# Patient Record
Sex: MALE | Race: WHITE | NOT HISPANIC OR LATINO | Employment: OTHER | URBAN - NONMETROPOLITAN AREA
[De-identification: names, ages, dates, MRNs, and addresses within clinical notes are randomized per-mention and may not be internally consistent; named-entity substitution may affect disease eponyms.]

---

## 2017-01-25 ENCOUNTER — OFFICE VISIT - GICH (OUTPATIENT)
Dept: FAMILY MEDICINE | Facility: OTHER | Age: 57
End: 2017-01-25

## 2017-01-25 DIAGNOSIS — N39.41 URGE INCONTINENCE: ICD-10-CM

## 2017-01-25 DIAGNOSIS — Z00.00 ENCOUNTER FOR GENERAL ADULT MEDICAL EXAMINATION WITHOUT ABNORMAL FINDINGS: ICD-10-CM

## 2017-01-25 DIAGNOSIS — E78.2 MIXED HYPERLIPIDEMIA: ICD-10-CM

## 2017-01-25 DIAGNOSIS — I10 ESSENTIAL (PRIMARY) HYPERTENSION: ICD-10-CM

## 2017-01-25 DIAGNOSIS — N40.1 ENLARGED PROSTATE WITH LOWER URINARY TRACT SYMPTOMS (LUTS): ICD-10-CM

## 2017-01-25 DIAGNOSIS — L21.9 SEBORRHEIC DERMATITIS: ICD-10-CM

## 2017-02-09 ENCOUNTER — AMBULATORY - GICH (OUTPATIENT)
Dept: LAB | Facility: OTHER | Age: 57
End: 2017-02-09

## 2017-02-09 DIAGNOSIS — Z00.00 ENCOUNTER FOR GENERAL ADULT MEDICAL EXAMINATION WITHOUT ABNORMAL FINDINGS: ICD-10-CM

## 2017-02-09 LAB
ANION GAP - HISTORICAL: 7 (ref 5–18)
BUN SERPL-MCNC: 16 MG/DL (ref 7–25)
BUN/CREAT RATIO - HISTORICAL: 20
CALCIUM SERPL-MCNC: 9.7 MG/DL (ref 8.6–10.3)
CHLORIDE SERPLBLD-SCNC: 107 MMOL/L (ref 98–107)
CHOL/HDL RATIO - HISTORICAL: 4.11
CHOLESTEROL TOTAL: 185 MG/DL
CO2 SERPL-SCNC: 22 MMOL/L (ref 21–31)
CREAT SERPL-MCNC: 0.82 MG/DL (ref 0.7–1.3)
GFR IF NOT AFRICAN AMERICAN - HISTORICAL: >60 ML/MIN/1.73M2
GLUCOSE SERPL-MCNC: 97 MG/DL (ref 70–105)
HDLC SERPL-MCNC: 45 MG/DL (ref 23–92)
LDLC SERPL CALC-MCNC: 109 MG/DL
NON-HDL CHOLESTEROL - HISTORICAL: 140 MG/DL
PATIENT STATUS - HISTORICAL: ABNORMAL
POTASSIUM SERPL-SCNC: 4.4 MMOL/L (ref 3.5–5.1)
PSA TOTAL (DIAGNOSTIC) - HISTORICAL: 0.89 NG/ML
SODIUM SERPL-SCNC: 136 MMOL/L (ref 133–143)
TRIGL SERPL-MCNC: 156 MG/DL

## 2017-11-08 ENCOUNTER — HISTORY (OUTPATIENT)
Dept: FAMILY MEDICINE | Facility: OTHER | Age: 57
End: 2017-11-08

## 2017-11-08 ENCOUNTER — OFFICE VISIT - GICH (OUTPATIENT)
Dept: FAMILY MEDICINE | Facility: OTHER | Age: 57
End: 2017-11-08

## 2017-11-08 DIAGNOSIS — M77.12 LATERAL EPICONDYLITIS OF LEFT ELBOW: ICD-10-CM

## 2017-11-08 DIAGNOSIS — H00.015 HORDEOLUM EXTERNUM OF LEFT LOWER EYELID: ICD-10-CM

## 2017-11-08 DIAGNOSIS — M77.11 LATERAL EPICONDYLITIS OF RIGHT ELBOW: ICD-10-CM

## 2017-12-28 NOTE — PROGRESS NOTES
"Patient Information     Patient Name MRN Sex Watson Jurado [id number] Male 1960      Progress Notes by Kang Flynn MD at 2017  1:30 PM     Author:  Kang Flynn MD Service:  (none) Author Type:  Physician     Filed:  2017 12:31 PM Encounter Date:  2017 Status:  Signed     :  Kang Flynn MD (Physician)            Nursing Notes:   Lashell Alfaro  2017  1:52 PM  Signed  Patient comes in to the clinic today to evaluate a problem with his left lower eyelid and also bilateral elbow pain.    DominicLashell jose  2017  2:16 PM  Signed  [facility] Protocol    A. Pre-procedure verification complete yes  1-relevant information / documentation available, reviewed and properly matched to the patient; 2-consent accurate and complete, 3-equipment and supplies available    B. Site marking complete N/A  Site marked if not in continuous attendance with patient    C. TIME OUT completed yes  Time Out was conducted just prior to starting procedure to verify the eight required elements: 1-patient identity, 2-consent accurate and complete, 3-position, 4-correct side/site marked (if applicable), 5-procedure, 6-relevant images / results properly labeled and displayed (if applicable), 7-antibiotics / irrigation fluids (if applicable), 8-safety precautions.          SUBJECTIVE:  Watson Price is a 56 y.o. Male.  He comes in today for evaluation of 2 issues. First issue is stye at his left lower eyelid. Has been present for the past week.  Vision has been normal.    Patient reports tenderness with palpation over lateral epicondyles. It is worse with lifting objects and pronation/supination and to a lesser degree extension of the wrist.  Has been present for the past couple months. Had similar symptoms over a year ago and had injection of lateral epicondyles with complete relief up until recently    OBJECTIVE:  /78  Pulse 76  Ht 1.778 m (5' 10\")  Wt 100.2 kg (221 lb)  " BMI 31.71 kg/m2  EXAM:  General Appearance: Pleasant, alert, appropriate appearance for age. No acute distress  Eye Exam: Lids: Style noted on lower lid.  Chest/Respiratory Exam: Normal chest wall and respirations. Clear to auscultation.  Cardiovascular Exam: Regular rate and rhythm. S1, S2, no murmur, click, gallop, or rubs.  Musculoskeletal Exam: Tender with palpation of both lateral epicondyles and to a lesser extent the medial epicondyles.    Results for orders placed or performed in visit on 02/09/17      LIPID PANEL      Result  Value Ref Range    CHOLESTEROL,TOTAL 185 <200 mg/dL    TRIGLYCERIDES 156 (H) <150 mg/dL    HDL CHOLESTEROL 45 23 - 92 mg/dL    NON-HDL CHOLESTEROL 140 <145 mg/dl    CHOL/HDL RATIO            4.11 <4.50                    LDL CHOLESTEROL 109 (H) <100 mg/dL    PATIENT STATUS            NOT GIVEN                   PSA, TOTAL      Result  Value Ref Range    PSA TOTAL (DIAGNOSTIC) 0.894 <=3.100 ng/mL   BASIC METABOLIC PANEL      Result  Value Ref Range    SODIUM 136 133 - 143 mmol/L    POTASSIUM 4.4 3.5 - 5.1 mmol/L    CHLORIDE 107 98 - 107 mmol/L    CO2,TOTAL 22 21 - 31 mmol/L    ANION GAP 7 5 - 18                    GLUCOSE 97 70 - 105 mg/dL    CALCIUM 9.7 8.6 - 10.3 mg/dL    BUN 16 7 - 25 mg/dL    CREATININE 0.82 0.70 - 1.30 mg/dL    BUN/CREAT RATIO           20                    GFR if African American >60 >60 ml/min/1.73m2    GFR if not African American >60 >60 ml/min/1.73m2       ASSESSMENT/Plan :      Watson was seen today for eye problem and arm pain/problem.    Diagnoses and all orders for this visit:    Hordeolum externum of left lower eyelid  discussed with patient that I would suggest warm pack compressions and should resolve on its own. He can try some Polytrim although I explained that the studies have not shown it to be particularly helpful over placebo.  -     rx trimethoprim-polymixin b (POLYTRIM) 0.1-1000 %-unit drop ophthalmic solution ([facility]); Place 1 Drop into left  eye every 4 hours for 7 days.    Lateral epicondylitis of left elbow  After chlorhexadine prep sterile procedure was used to inject 10mg of kenalog and 1ml of 1% lidocaine and 1ml of 0.25% marcaine just distal to the left lateral epicondyle at point of maximal tenderness in a fan-like fashion using a 25 gauge needle.  Total volume of 2ml injected with no complication.  Patient had excellent improvement in symptoms following injection.     -     NM DRAIN/INJECT INTERMEDIATE JOINT/BURSA (wrist, elbow) - bilateral joints [63561.0]  -     triamcinolone acetonide 10 mg injection (KENALOG); Inject 1 mL intra-articular one time.    Lateral epicondylitis of right elbow  After chlorhexadine prep sterile procedure was used to inject 10mg of kenalog and 1ml of 1% lidocaine and 1ml of 0.25% marcaine just distal to the right lateral epicondyle at point of maximal tenderness in a fan-like fashion using a 25 gauge needle.  Total volume of 2ml injected with no complication.  Patient had excellent improvement in symptoms following injection.     -     NM DRAIN/INJECT INTERMEDIATE JOINT/BURSA (wrist, elbow) - bilateral joints [41781.0]  -     triamcinolone acetonide 10 mg injection (KENALOG); Inject 1 mL intra-articular one time.      Suggest icing and stretching of both lateral and medial epicondyles.    There are no Patient Instructions on file for this visit.    Kang Flynn MD    This document was created using computer generated templates and voice activated software.

## 2017-12-30 NOTE — NURSING NOTE
Patient Information     Patient Name MRN Sex Watson Jurado [id number] Male 1960      Nursing Note by Lashell Alfaro at 2017  1:30 PM     Author:  Lashell Alfaro Service:  (none) Author Type:  (none)     Filed:  2017  2:16 PM Encounter Date:  2017 Status:  Signed     :  Lashell Alfaro            Universal Protocol    A. Pre-procedure verification complete yes  1-relevant information / documentation available, reviewed and properly matched to the patient; 2-consent accurate and complete, 3-equipment and supplies available    B. Site marking complete N/A  Site marked if not in continuous attendance with patient    C. TIME OUT completed yes  Time Out was conducted just prior to starting procedure to verify the eight required elements: 1-patient identity, 2-consent accurate and complete, 3-position, 4-correct side/site marked (if applicable), 5-procedure, 6-relevant images / results properly labeled and displayed (if applicable), 7-antibiotics / irrigation fluids (if applicable), 8-safety precautions.

## 2017-12-30 NOTE — NURSING NOTE
Patient Information     Patient Name MRN Sex Watson Jurado [id number] Male 1960      Nursing Note by Lashell Alfaro at 2017  1:30 PM     Author:  Lashell Alfaro Service:  (none) Author Type:  (none)     Filed:  2017  1:52 PM Encounter Date:  2017 Status:  Signed     :  Lashell Alfaro            Patient comes in to the clinic today to evaluate a problem with his left lower eyelid and also bilateral elbow pain.

## 2018-01-03 NOTE — PROGRESS NOTES
Patient Information     Patient Name MRN Sex Watson Jurado [id number] Male 1960      Progress Notes by Kang Flynn MD at 2017  2:30 PM     Author:  Kang Flynn MD Service:  (none) Author Type:  Physician     Filed:  2017 10:45 AM Encounter Date:  2017 Status:  Signed     :  Kang Flynn MD (Physician)            SUBJECTIVE:    Watson Price is a 56 y.o. male who presents for physical and management of chronic diseases    HPI: Patient reports that he feels well. He continues to take metoprolol, simvastatin and Flomax. He is also taking aspirin 81 mg per day. He denies any chest pain, shortness of breath, indigestion with exertion or any other anginal equivalents. He does have a small lump on his back that he thinks maybe has gotten bigger. It is nonpainful and does not bother him.    He reports the Flomax is worse excellent at improving flow. He does not have any problems with dribbling but does report some urinary urge not quite to the level of incontinence. Often he left a run to the bathroom oral have to urinate outside if possible.    PROBLEM LIST:  Patient Active Problem List     Diagnosis  Code     NODULAR PROSTATE WITH URINARY OBSTRUCTION N40.3, N13.8     Migraine, unspecified, without mention of intractable migraine without mention of status migrainosus G43.909     Hypertension I10     Right rotator cuff tear M75.101     S/P arthroscopic right shoulder surgery Z98.890     PAST MEDICAL HISTORY:  Past Medical History      Diagnosis   Date     DIVERTICULOSIS, MILD  1/3/2011      inactive icd-9 diagnosis auto replaced with icd-10, display name retained//mporz      Hypertension       Impingement syndrome of right shoulder  2015     Migraine       Nodular prostate with urinary obstruction       Right rotator cuff tear  2015     Right rotator cuff tendonitis  2015     S/P arthroscopic right shoulder surgery  2016     SURGICAL  HISTORY:  Past Surgical History       Procedure   Laterality Date     Fracture treatment   2009     Left,  Mandibular fracture with subsequent ORIF        Colonoscopy screening   01/03/2011     F/U 2021       S/p right arthroscopic shoulder surgery  Right 1/6/2016       SOCIAL HISTORY:  Social History     Social History        Marital status:       Spouse name: N/A     Number of children:  N/A     Years of education:  N/A     Occupational History      Not on file.     Social History Main Topics          Smoking status:   Current Every Day Smoker      Packs/day:  5.00      Types:  Cigars      Smokeless tobacco:   Never Used      Alcohol use   Yes      Comment: some - 8 per week       Drug use:   No      Sexual activity:   Not on file      Other Topics  Concern     Not on file      Social History Narrative     .  Runs a [employer] [place].      Works as a [employer] with [employer]    Smokes cigars.  Positive alcohol.             FAMILY HISTORY:  Family History       Problem   Relation Age of Onset     Stroke  Mother 76     Other  Father      Macular degeneration.       Heart Disease  Father      Possible CAD        CURRENT MEDICATIONS:   Current Outpatient Prescriptions       Medication  Sig Dispense Refill     ketoconazole 2% topical (NIZORAL) cream AAA Bid 60 g 0     metoprolol succinate (TOPROL XL) 100 mg Sustained-Release tablet Take 1 tablet by mouth once daily. 90 tablet 3     oxybutynin XL (DITROPAN XL) 5 mg CR tablet Take 1 tablet by mouth once daily. 30 tablet 11     simvastatin (ZOCOR) 20 mg tablet Take 1 tablet by mouth at bedtime. 90 tablet 3     tamsulosin (FLOMAX) 0.4 mg capsule Take 1 capsule by mouth once daily after a meal. 90 capsule 3     No current facility-administered medications for this visit.      Medications have been reviewed by me and are current to the best of my knowledge and ability.    ALLERGIES:  Review of patient's allergies indicates no known  "allergies.    REVIEW OF SYSTEMS:  General: denies any general problems.  Eyes: denies problems  Ears/Nose/Throat: denies problems  Cardiovascular: denies problems  Respiratory: denies problems  Gastrointestinal: denies problems  Genitourinary: denies problems  Musculoskeletal: denies problems  Skin: See history of present illness  Neurologic: denies problems  Psychiatric: denies problems  Endocrine: denies problems  Heme/Lymphatic: denies problems  Allergic/Immunologic: denies problems  PHQ Depression Screening 1/25/2017   Date of PHQ exam (doc flow) 1/25/2017   1. Lack of interest/pleasure 0 - Not at all   2. Feeling down/depressed 0 - Not at all   PHQ-2 TOTAL SCORE 0   3. Trouble sleeping -   4. Decreased energy -   5. Appetite change -   6. Feelings of failure -   7. Trouble concentrating -   8. Activity level -   9. Hurting yourself -   PHQ-9 TOTAL SCORE -   PHQ-9 Severity Level -   Functional Impairment -      OBJECTIVE:  /84  Pulse 80  Ht 1.778 m (5' 10\")  Wt 96.6 kg (213 lb)  BMI 30.56 kg/m2  EXAM:   General Appearance: Pleasant, alert, appropriate appearance for age. No acute distress  Head Exam: Normal. Normocephalic, atraumatic.  Eye Exam:  Normal external eye, conjunctiva, lids, cornea. JEFF.  Fundoscopic Exam: Normal red reflex and fundoscopic exam.  Ear Exam: Normal TM's bilaterally. Normal auditory canals and external ears. Non-tender.  Nose Exam: Normal external nose, mucus membranes, and septum.  OroPharynx Exam:  Dental hygiene adequate. Normal buccal mucosa. Normal pharynx.  Neck Exam:  Supple, no masses or nodes.  Thyroid Exam: No nodules or enlargement.  Chest/Respiratory Exam: Normal chest wall and respirations. Clear to auscultation.  Cardiovascular Exam: Regular rate and rhythm. S1, S2, no murmur, click, gallop, or rubs.  Gastrointestinal Exam: Soft, non-tender, no masses or organomegaly.  Rectal Exam: Normal sphincter tone. No masses noted.  No nodules noted today.  Symmetric but " diffusely enlarged prostate, unchanged from previous.  Genitourinary Exam Male: Normal male genitalia. No discharge or penile ulcerations. No testicular masses or swelling.  Lymphatic Exam: Non-palpable nodes in neck, clavicular, axillary, or inguinal regions.  Musculoskeletal Exam: Back is straight and non-tender, full ROM of upper and lower extremities.  Foot Exam: Left and right foot: good pedal pulses, no lesions, nail hygiene good.  Skin: Seborrhea noted near her eyebrows and mustache.  No lesions concerning for skin cancer. He does have a 1 cm sebaceous cyst near midline of the upper thoracic spine. It is nontender and noninflamed.  Neurologic Exam: Nonfocal, symmetric DTRs, normal gross motor, tone coordination and no tremor.  Psychiatric Exam: Alert and oriented - appropriate affect.          ASSESSMENT/PLAN:    ICD-10-CM    1. Physical exam Z00.00 LIPID PANEL      PSA TOTAL (DIAGNOSTIC)      BASIC METABOLIC PANEL - patient will come in when fasting for annual labs.    2. Hypertension I10 metoprolol succinate (TOPROL XL) 100 mg Sustained-Release tablet - blood pressure at goal.    3. Mixed hyperlipidemia E78.2 simvastatin (ZOCOR) 20 mg tablet   4. Benign non-nodular prostatic hyperplasia with lower urinary tract symptoms N40.1 tamsulosin (FLOMAX) 0.4 mg capsule - continue Flomax. We will recheck PSA.    5. Seborrhea L21.9 ketoconazole 2% topical (NIZORAL) cream   6. Urge incontinence N39.41 oxybutynin XL (DITROPAN XL) 5 mg CR tablet - trial Ditropan. Refer to urology if ineffective or if PSA comes back elevated      Mr. Price's Body mass index is 30.56 kg/(m^2). This is out of the normal range for a 56 y.o. Normal range for ages 18-64 is between 18.5 and 24.9; normal range for ages 65+ is 23-30. To lose weight we reviewed risks and benefits of appropriate options such as diet, exercise, and medications. Patient's strategy will be  self-directed nutrition plan and self-directed exercise program  BP Readings  from Last 1 Encounters:01/25/17 : 118/84  Mr. Price's blood pressure is out of the normal range for adults. Per JNC-8 guidelines normal adult blood pressure is < 120/80, pre-hypertensive is between 120/80 and 139/89, and hypertension is 140/90 or greater. Risks of hypertension were discussed. Patient's strategy will be reduced salt intake    Kang Flynn MD

## 2018-01-16 ENCOUNTER — COMMUNICATION - GICH (OUTPATIENT)
Dept: FAMILY MEDICINE | Facility: OTHER | Age: 58
End: 2018-01-16

## 2018-01-16 DIAGNOSIS — H00.015 HORDEOLUM EXTERNUM OF LEFT LOWER EYELID: ICD-10-CM

## 2018-01-23 ENCOUNTER — COMMUNICATION - GICH (OUTPATIENT)
Dept: FAMILY MEDICINE | Facility: OTHER | Age: 58
End: 2018-01-23

## 2018-01-23 DIAGNOSIS — I10 ESSENTIAL (PRIMARY) HYPERTENSION: ICD-10-CM

## 2018-01-23 DIAGNOSIS — N39.41 URGE INCONTINENCE: ICD-10-CM

## 2018-01-27 VITALS
WEIGHT: 221 LBS | SYSTOLIC BLOOD PRESSURE: 124 MMHG | HEART RATE: 76 BPM | HEIGHT: 70 IN | DIASTOLIC BLOOD PRESSURE: 78 MMHG | BODY MASS INDEX: 31.64 KG/M2

## 2018-01-27 VITALS
DIASTOLIC BLOOD PRESSURE: 84 MMHG | HEART RATE: 80 BPM | SYSTOLIC BLOOD PRESSURE: 118 MMHG | WEIGHT: 213 LBS | BODY MASS INDEX: 30.49 KG/M2 | HEIGHT: 70 IN

## 2018-02-05 ENCOUNTER — DOCUMENTATION ONLY (OUTPATIENT)
Dept: FAMILY MEDICINE | Facility: OTHER | Age: 58
End: 2018-02-05

## 2018-02-05 PROBLEM — N40.1 BENIGN NON-NODULAR PROSTATIC HYPERPLASIA WITH LOWER URINARY TRACT SYMPTOMS: Status: ACTIVE | Noted: 2017-01-30

## 2018-02-05 RX ORDER — SIMVASTATIN 20 MG
20 TABLET ORAL AT BEDTIME
COMMUNITY
Start: 2017-01-25 | End: 2018-02-23

## 2018-02-05 RX ORDER — TAMSULOSIN HYDROCHLORIDE 0.4 MG/1
0.4 CAPSULE ORAL DAILY
COMMUNITY
Start: 2017-01-25 | End: 2018-02-23

## 2018-02-05 RX ORDER — KETOCONAZOLE 20 MG/G
CREAM TOPICAL
COMMUNITY
Start: 2017-01-25 | End: 2018-02-28

## 2018-02-05 RX ORDER — OXYBUTYNIN CHLORIDE 5 MG/1
5 TABLET, EXTENDED RELEASE ORAL DAILY
COMMUNITY
Start: 2017-01-25 | End: 2018-02-28

## 2018-02-05 RX ORDER — METOPROLOL SUCCINATE 100 MG/1
100 TABLET, EXTENDED RELEASE ORAL DAILY
COMMUNITY
Start: 2017-01-25 | End: 2018-02-28

## 2018-02-13 NOTE — TELEPHONE ENCOUNTER
Patient Information     Patient Name MRN Watson Mendoza [id number] Male 1960      Telephone Encounter by Cathy Pinto RN at 2018 11:19 AM     Author:  Cathy Pinto RN Service:  (none) Author Type:  NURS- Registered Nurse     Filed:  2018 11:23 AM Encounter Date:  2018 Status:  Signed     :  Cathy Pinto RN (NURS- Registered Nurse)            Patient is due for annual physical today, 2018.    Beta Blockers     Office visit in the past 12 months or per provider note.    Last visit with EVY MIKE was on: 2017 in [facility]  Next visit with EVY MIKE is on: No future appointment listed with this provider  Next visit with Family Practice is on: No future appointment listed in this department    Max refill for 12 months from last office visit or per provider note.    Office visit in the past 12 months or per provider note.    Last visit with EVY MIKE was on: 2017 in [facility]  Next visit with EVY MIKE is on: No future appointment listed with this provider  Next visit with Family Practice is on: No future appointment listed in this department    Max refill for 12 months from last office visit or per provider note.    Patient is due for medication management appointment. Limited refill provided at this time. [employer] message and/or letter sent for reminder to patient. Prescription refilled per RN Medication Refill Policy.................... Cathy Pinto RN ....................  2018   11:22 AM

## 2018-02-13 NOTE — TELEPHONE ENCOUNTER
Patient Information     Patient Name MRN Watson Mendoza [id number] Male 1960      Telephone Encounter by Sofía Prescott at 2018  1:35 PM     Author:  Sofía Prescott Service:  (none) Author Type:  (none)     Filed:  2018  1:37 PM Encounter Date:  2018 Status:  Signed     :  Sofía Prescott            Patient was seen by Dr. Flynn for a stye.  The original prescription was sent to [facility], but the prescription needs to be sent to [facility].  Can prescription be sent to [facility] please?  Patient is still having difficulty with the stye.  Thanks!  Sofía Prescott ....................  2018   1:37 PM

## 2018-02-23 DIAGNOSIS — N40.1 BENIGN PROSTATIC HYPERPLASIA WITH LOWER URINARY TRACT SYMPTOMS, SYMPTOM DETAILS UNSPECIFIED: ICD-10-CM

## 2018-02-23 DIAGNOSIS — E78.00 HIGH BLOOD CHOLESTEROL: Primary | ICD-10-CM

## 2018-02-28 ENCOUNTER — OFFICE VISIT (OUTPATIENT)
Dept: FAMILY MEDICINE | Facility: OTHER | Age: 58
End: 2018-02-28
Attending: FAMILY MEDICINE
Payer: COMMERCIAL

## 2018-02-28 VITALS
DIASTOLIC BLOOD PRESSURE: 74 MMHG | SYSTOLIC BLOOD PRESSURE: 118 MMHG | HEART RATE: 92 BPM | WEIGHT: 217 LBS | BODY MASS INDEX: 31.14 KG/M2

## 2018-02-28 DIAGNOSIS — E78.00 HIGH BLOOD CHOLESTEROL: ICD-10-CM

## 2018-02-28 DIAGNOSIS — R39.15 URINARY URGENCY: ICD-10-CM

## 2018-02-28 DIAGNOSIS — G62.9 PERIPHERAL POLYNEUROPATHY: ICD-10-CM

## 2018-02-28 DIAGNOSIS — I10 ESSENTIAL HYPERTENSION: ICD-10-CM

## 2018-02-28 DIAGNOSIS — M19.042 PRIMARY OSTEOARTHRITIS OF BOTH HANDS: ICD-10-CM

## 2018-02-28 DIAGNOSIS — Z00.00 ROUTINE HISTORY AND PHYSICAL EXAMINATION OF ADULT: Primary | ICD-10-CM

## 2018-02-28 DIAGNOSIS — M19.041 PRIMARY OSTEOARTHRITIS OF BOTH HANDS: ICD-10-CM

## 2018-02-28 DIAGNOSIS — N40.1 BENIGN PROSTATIC HYPERPLASIA WITH LOWER URINARY TRACT SYMPTOMS, SYMPTOM DETAILS UNSPECIFIED: ICD-10-CM

## 2018-02-28 DIAGNOSIS — B35.1 ONYCHOMYCOSIS DUE TO DERMATOPHYTE: ICD-10-CM

## 2018-02-28 LAB
ANION GAP SERPL CALCULATED.3IONS-SCNC: 7 MMOL/L (ref 3–14)
BUN SERPL-MCNC: 19 MG/DL (ref 7–25)
CALCIUM SERPL-MCNC: 9.8 MG/DL (ref 8.6–10.3)
CHLORIDE SERPL-SCNC: 105 MMOL/L (ref 98–107)
CHOLEST SERPL-MCNC: 197 MG/DL
CO2 SERPL-SCNC: 27 MMOL/L (ref 21–31)
CREAT SERPL-MCNC: 0.9 MG/DL (ref 0.7–1.3)
ERYTHROCYTE [DISTWIDTH] IN BLOOD BY AUTOMATED COUNT: 12.8 % (ref 10–15)
FOLATE SERPL-MCNC: 12.1 NG/ML
GFR SERPL CREATININE-BSD FRML MDRD: 87 ML/MIN/1.7M2
GLUCOSE SERPL-MCNC: 129 MG/DL (ref 70–105)
HCT VFR BLD AUTO: 44.5 % (ref 40–53)
HDLC SERPL-MCNC: 42 MG/DL (ref 23–92)
HGB BLD-MCNC: 14.9 G/DL (ref 13.3–17.7)
LDLC SERPL CALC-MCNC: 99 MG/DL
MCH RBC QN AUTO: 31.1 PG (ref 26.5–33)
MCHC RBC AUTO-ENTMCNC: 33.5 G/DL (ref 31.5–36.5)
MCV RBC AUTO: 93 FL (ref 78–100)
NONHDLC SERPL-MCNC: 155 MG/DL
PLATELET # BLD AUTO: 242 10E9/L (ref 150–450)
POTASSIUM SERPL-SCNC: 3.9 MMOL/L (ref 3.5–5.1)
PSA SERPL-MCNC: 0.77 NG/ML
RBC # BLD AUTO: 4.79 10E12/L (ref 4.4–5.9)
SODIUM SERPL-SCNC: 139 MMOL/L (ref 134–144)
TRIGL SERPL-MCNC: 280 MG/DL
VIT B12 SERPL-MCNC: 400 PG/ML (ref 180–914)
WBC # BLD AUTO: 6.9 10E9/L (ref 4–11)

## 2018-02-28 PROCEDURE — 80048 BASIC METABOLIC PNL TOTAL CA: CPT | Performed by: FAMILY MEDICINE

## 2018-02-28 PROCEDURE — 82607 VITAMIN B-12: CPT | Performed by: FAMILY MEDICINE

## 2018-02-28 PROCEDURE — 99396 PREV VISIT EST AGE 40-64: CPT | Performed by: FAMILY MEDICINE

## 2018-02-28 PROCEDURE — 36415 COLL VENOUS BLD VENIPUNCTURE: CPT | Performed by: FAMILY MEDICINE

## 2018-02-28 PROCEDURE — 85027 COMPLETE CBC AUTOMATED: CPT | Performed by: FAMILY MEDICINE

## 2018-02-28 PROCEDURE — 84153 ASSAY OF PSA TOTAL: CPT | Performed by: FAMILY MEDICINE

## 2018-02-28 PROCEDURE — G0463 HOSPITAL OUTPT CLINIC VISIT: HCPCS

## 2018-02-28 PROCEDURE — 80061 LIPID PANEL: CPT | Performed by: FAMILY MEDICINE

## 2018-02-28 PROCEDURE — 82746 ASSAY OF FOLIC ACID SERUM: CPT | Performed by: FAMILY MEDICINE

## 2018-02-28 RX ORDER — SIMVASTATIN 20 MG
TABLET ORAL
Qty: 30 TABLET | Refills: 11 | Status: SHIPPED | OUTPATIENT
Start: 2018-02-28 | End: 2018-02-28

## 2018-02-28 RX ORDER — TAMSULOSIN HYDROCHLORIDE 0.4 MG/1
0.4 CAPSULE ORAL DAILY
Qty: 90 CAPSULE | Refills: 3 | Status: SHIPPED | OUTPATIENT
Start: 2018-02-28 | End: 2019-02-12

## 2018-02-28 RX ORDER — TAMSULOSIN HYDROCHLORIDE 0.4 MG/1
CAPSULE ORAL
Qty: 30 CAPSULE | Refills: 11 | Status: SHIPPED | OUTPATIENT
Start: 2018-02-28 | End: 2018-02-28

## 2018-02-28 RX ORDER — METOPROLOL SUCCINATE 100 MG/1
100 TABLET, EXTENDED RELEASE ORAL DAILY
Qty: 90 TABLET | Refills: 3 | Status: SHIPPED | OUTPATIENT
Start: 2018-02-28 | End: 2019-02-12

## 2018-02-28 RX ORDER — OXYBUTYNIN CHLORIDE 5 MG/1
5 TABLET, EXTENDED RELEASE ORAL DAILY
Qty: 90 TABLET | Refills: 3 | Status: SHIPPED | OUTPATIENT
Start: 2018-02-28 | End: 2019-02-12

## 2018-02-28 RX ORDER — SIMVASTATIN 20 MG
20 TABLET ORAL AT BEDTIME
Qty: 90 TABLET | Refills: 3 | Status: SHIPPED | OUTPATIENT
Start: 2018-02-28 | End: 2019-02-12

## 2018-02-28 NOTE — TELEPHONE ENCOUNTER
Refill request for Zocor and Flomax sent from [facility].    Evan Marinelli LPN 02/28/18 9:00 AM

## 2018-02-28 NOTE — MR AVS SNAPSHOT
After Visit Summary   2/28/2018    Watson Price    MRN: [id number]           Patient Information     Date Of Birth          1960        Visit Information        Provider Department      2/28/2018 1:30 PM Kang Flynn MD [facility]        Today's Diagnoses     Routine history and physical examination of adult    -  1    High blood cholesterol        Benign prostatic hyperplasia with lower urinary tract symptoms, symptom details unspecified        Urinary urgency        Essential hypertension        Onychomycosis due to dermatophyte        Primary osteoarthritis of both hands        Peripheral polyneuropathy          Care Instructions      Preventive Health Recommendations  Male Ages 50 - 64    Yearly exam:             See your health care provider every year in order to  o   Review health changes.   o   Discuss preventive care.    o   Review your medicines if your doctor has prescribed any.     Have a cholesterol test every 5 years, or more frequently if you are at risk for high cholesterol/heart disease.     Have a diabetes test (fasting glucose) every three years. If you are at risk for diabetes, you should have this test more often.     Have a colonoscopy at age 50, or have a yearly FIT test (stool test). These exams will check for colon cancer.      Talk with your health care provider about whether or not a prostate cancer screening test (PSA) is right for you.    You should be tested each year for STDs (sexually transmitted diseases), if you re at risk.     Shots: Get a flu shot each year. Get a tetanus shot every 10 years.     Nutrition:    Eat at least 5 servings of fruits and vegetables daily.     Eat whole-grain bread, whole-wheat pasta and brown rice instead of white grains and rice.     Talk to your provider about Calcium and Vitamin D.     Lifestyle    Exercise for at least 150 minutes a week (30 minutes a day, 5 days a week). This will help you control  your weight and prevent disease.     Limit alcohol to one drink per day.     No smoking.     Wear sunscreen to prevent skin cancer.     See your dentist every six months for an exam and cleaning.     See your eye doctor every 1 to 2 years.            Follow-ups after your visit        Your next 10 appointments already scheduled     Mar 08, 2018  2:30 PM CST   (Arrive by 2:15 PM)   XR HAND LEFT 2 VIEWS with [id number]   [facility]    [address]  [place] [address]   [phone]           Please bring a list of your current medicines to your exam. (Include vitamins, minerals and over-thecounter medicines.) Leave your valuables at home.  Tell your doctor if there is a chance you may be pregnant.  You do not need to do anything special for this exam.            Mar 08, 2018  2:45 PM CST   XR HAND RIGHT 2 VIEWS with [id number]   [facility] ([facility])    [address]  [place] [address]   [phone]           Please bring a list of your current medicines to your exam. (Include vitamins, minerals and over-thecounter medicines.) Leave your valuables at home.  Tell your doctor if there is a chance you may be pregnant.  You do not need to do anything special for this exam.              Who to contact     If you have questions or need follow up information about today's clinic visit or your schedule please contact [facility] directly at [phone].  Normal or non-critical lab and imaging results will be communicated to you by People Patternhart, letter or phone within 4 business days after the clinic has received the results. If you do not hear from us within 7 days, please contact the clinic through Grow Mobilet or phone. If you have a critical or abnormal lab result, we will notify you by phone as soon as possible.  Submit refill requests through Floor64 or call your pharmacy and they  will forward the refill request to us. Please allow 3 business days for your refill to be completed.          Additional Information About Your Visit        Care EveryWhere ID     This is your Care EveryWhere ID. This could be used by other organizations to access your [place] medical records  [id number]        Your Vitals Were     Pulse BMI (Body Mass Index)                92 31.14 kg/m2           Blood Pressure from Last 3 Encounters:   02/28/18 118/74   11/08/17 124/78   01/25/17 118/84    Weight from Last 3 Encounters:   02/28/18 217 lb (98.4 kg)   11/08/17 221 lb (100.2 kg)   01/25/17 213 lb (96.6 kg)              We Performed the Following     Basic metabolic panel     CBC with platelets     Folate     Lipid Profile (Chol, Trig, HDL, LDL calc) - FASTING     PSA GH     Vitamin B12          Today's Medication Changes          These changes are accurate as of 2/28/18 11:59 PM.  If you have any questions, ask your nurse or doctor.               Start taking these medicines.        Dose/Directions    terbinafine 1 % cream   Commonly known as:  lamISIL   Used for:  Onychomycosis due to dermatophyte   Started by:  Kang Flynn MD        Apply topically 2 times daily   Quantity:  30 g   Refills:  1         These medicines have changed or have updated prescriptions.        Dose/Directions    simvastatin 20 MG tablet   Commonly known as:  ZOCOR   This may have changed:  See the new instructions.   Used for:  High blood cholesterol   Changed by:  Kang Flynn MD        Dose:  20 mg   Take 1 tablet (20 mg) by mouth At Bedtime   Quantity:  90 tablet   Refills:  3       tamsulosin 0.4 MG capsule   Commonly known as:  FLOMAX   This may have changed:  See the new instructions.   Used for:  Benign prostatic hyperplasia with lower urinary tract symptoms, symptom details unspecified   Changed by:  Kang Flynn MD        Dose:  0.4 mg   Take 1 capsule (0.4 mg) by mouth daily   Quantity:  90 capsule   Refills:   3         Stop taking these medicines if you haven't already. Please contact your care team if you have questions.     ketoconazole 2 % cream   Commonly known as:  NIZORAL   Stopped by:  Kang Flynn MD                Where to get your medicines      These medications were sent to [id number] - [place], [place] - [address]  [address], [place] [address]     Phone:  [phone]     metoprolol succinate 100 MG 24 hr tablet    oxybutynin 5 MG 24 hr tablet    simvastatin 20 MG tablet    tamsulosin 0.4 MG capsule    terbinafine 1 % cream                Primary Care Provider Office Phone # Fax #    Kang Flynn [phone]       [address]  [facility] [address]        Equal Access to Services     [facility]: Hadii aad ku hadasho Soomaali, waaxda luqadaha, qaybta kaalmada adeegyada, diya marcum hayheather ashford . So [facility] [phone].    ATENCIÓN: Si habla español, tiene a rivera disposición servicios gratuitos de asistencia lingüística. [facility] [phone].    We comply with applicable federal civil rights laws and [place] laws. We do not discriminate on the basis of race, color, national origin, age, disability, sex, sexual orientation, or gender identity.            Thank you!     Thank you for choosing [facility]  for your care. Our goal is always to provide you with excellent care. Hearing back from our patients is one way we can continue to improve our services. Please take a few minutes to complete the written survey that you may receive in the mail after your visit with us. Thank you!             Your Updated Medication List - Protect others around you: Learn how to safely use, store and throw away your medicines at www.disposemymeds.org.          This list is accurate as of 2/28/18 11:59 PM.  Always use your most recent med list.                   Brand Name Dispense Instructions for use  Diagnosis    metoprolol succinate 100 MG 24 hr tablet    TOPROL-XL    90 tablet    Take 1 tablet (100 mg) by mouth daily    Essential hypertension       oxybutynin 5 MG 24 hr tablet    DITROPAN-XL    90 tablet    Take 1 tablet (5 mg) by mouth daily    Urinary urgency       simvastatin 20 MG tablet    ZOCOR    90 tablet    Take 1 tablet (20 mg) by mouth At Bedtime    High blood cholesterol       tamsulosin 0.4 MG capsule    FLOMAX    90 capsule    Take 1 capsule (0.4 mg) by mouth daily    Benign prostatic hyperplasia with lower urinary tract symptoms, symptom details unspecified       terbinafine 1 % cream    lamISIL    30 g    Apply topically 2 times daily    Onychomycosis due to dermatophyte

## 2018-02-28 NOTE — LETTER
March 5, 2018      Watson Price  [address] [address]        Dear ,    We are writing to inform you of your test results.    Overall your results were ok, but your blood sugar was elevated.  This concerns me that you may be developing diabetes which could be causing your abnormal sensation in your feet and can lead to other more serious complications if not treated.  I would like you to come back in to see me in a couple weeks so we can do some additional testing.  Your cholesterol is also a little high.  Weight loss of 10lbs could significantly improve both your blood sugars and your cholesterol.  Please try to reduce any simple carbohydrates or fried/fatty foods and shoot for goal weight loss of 2lbs per month for the next 6 mos.    Resulted Orders   PSA GH   Result Value Ref Range    Prostate Specific Antigen 0.767 <3.100 ng/mL   CBC with platelets   Result Value Ref Range    WBC 6.9 4.0 - 11.0 10e9/L    RBC Count 4.79 4.4 - 5.9 10e12/L    Hemoglobin 14.9 13.3 - 17.7 g/dL    Hematocrit 44.5 40.0 - 53.0 %    MCV 93 78 - 100 fl    MCH 31.1 26.5 - 33.0 pg    MCHC 33.5 31.5 - 36.5 g/dL    RDW 12.8 10.0 - 15.0 %    Platelet Count 242 150 - 450 10e9/L   Vitamin B12   Result Value Ref Range    Vitamin B12 400 180 - 914 pg/mL   Folate   Result Value Ref Range    Folate 12.1 >5.21 ng/mL   Basic metabolic panel   Result Value Ref Range    Sodium 139 134 - 144 mmol/L    Potassium 3.9 3.5 - 5.1 mmol/L    Chloride 105 98 - 107 mmol/L    Carbon Dioxide 27 21 - 31 mmol/L    Anion Gap 7 3 - 14 mmol/L    Glucose 129 (H) 70 - 105 mg/dL    Urea Nitrogen 19 7 - 25 mg/dL    Creatinine 0.90 0.70 - 1.30 mg/dL    GFR Estimate 87 >60 mL/min/1.7m2    GFR Estimate If Black >90 >60 mL/min/1.7m2    Calcium 9.8 8.6 - 10.3 mg/dL   Lipid Profile (Chol, Trig, HDL, LDL calc) - FASTING   Result Value Ref Range    Cholesterol 197 <200 mg/dL    Triglycerides 280 (H) <150 mg/dL      Comment:      Borderline high:  150-199  mg/dl  High:             200-499 mg/dl  Very high:       >499 mg/dl      HDL Cholesterol 42 23 - 92 mg/dL    LDL Cholesterol Calculated 99 <100 mg/dL      Comment:      Desirable:       <100 mg/dl    Non HDL Cholesterol 155 (H) <130 mg/dL      Comment:      Above Desirable:  130-159 mg/dl  Borderline high:  160-189 mg/dl  High:             190-219 mg/dl  Very high:       >219 mg/dl         If you have any questions or concerns, please call the clinic at the number listed above.       Sincerely,        Kang Flynn MD

## 2018-03-05 NOTE — PROGRESS NOTES
"  SUBJECTIVE:   CC: Watson Price is an 57 year old male who presents for preventative health visit.     Healthy Habits:    Do you get at least three servings of calcium containing foods daily (dairy, green leafy vegetables, etc.)? yes    Problems taking medications regularly No    Medication side effects: No    He comes in today with his wife for annual physical.  He has a few concerns.  First concern is pain that he has noticed that his first MCP.  At times feels like it is locking.  He does not recall any specific trauma to the area but tells me that he is often wrenching on his vehicles and [place].  Reports he \"busted his knuckles\" lots of times.  Symptoms are bilateral but worse on the right.    Next issue is pins and needle sensation on the balls of his feet.  Feels as if it is asleep.  Symptoms used to be intermittent but now are fairly consistent.  No polydipsia.  Does report some chronic urinary urgency, better with medication.  At times notices that his feet can become quite cold and pale.  Not really affected as much in his hands.    He is on oxybutynin and reports significant improvement in the urinary urgency when he takes the medication.  Also on Flomax which has helped with flow.  No recent changes in symptoms.    Continues on simvastatin without side effect.  Also on metoprolol.      Today's PHQ-2 Score:   PHQ-2 ( 1999 Pfizer) 2/28/2018   Q1: Little interest or pleasure in doing things 0   Q2: Feeling down, depressed or hopeless 0   PHQ-2 Score 0       Abuse: Current or Past(Physical, Sexual or Emotional)- No  Do you feel safe in your environment - Yes    Social History   Substance Use Topics     Smoking status: Current Every Day Smoker     Packs/day: 5.00     Types: Cigars     Smokeless tobacco: Never Used     Alcohol use 14.4 oz/week      Comment: Alcoholic Drinks/day: case of beer per week                          Last PSA: No results found for: PSA      Reviewed and updated " as needed this visit by clinical staff  Tobacco  Allergies  Meds         Reviewed and updated as needed this visit by Provider        Past Medical History:   Diagnosis Date     Diverticulosis of large intestine without perforation or abscess without bleeding     1/3/2011, inactive icd-9 diagnosis auto replaced with icd-10, display name retained//mporz     Essential (primary) hypertension     No Comments Provided     Impingement syndrome of right shoulder     5/21/2015     Migraine without status migrainosus, not intractable     No Comments Provided     Nodular prostate with lower urinary tract symptoms     No Comments Provided     Other shoulder lesions, right shoulder     5/21/2015     Other specified postprocedural states     1/6/2016     Right rotator cuff tear     6/12/2015      Past Surgical History:   Procedure Laterality Date     COLONOSCOPY      01/03/2011,F/U 2021     FRACTURE SURGERY      2009,Left,  Mandibular fracture with subsequent ORIF     OTHER SURGICAL HISTORY      1/6/2016,029521,OTHER,Right       ROS:  C: NEGATIVE for fever, chills, change in weight  I: NEGATIVE for worrisome rashes, moles or lesions  E: NEGATIVE for vision changes or irritation  ENT: NEGATIVE for ear, mouth and throat problems  R: NEGATIVE for significant cough or SOB  CV: NEGATIVE for chest pain, palpitations or peripheral edema  GI: NEGATIVE for nausea, abdominal pain, heartburn, or change in bowel habits   male: negative for dysuria, hematuria, erectile dysfunction, urethral discharge.  Does have urinary urgency and chronic decreased stream with no acute change.  M: See HPI  N: NEGATIVE for weakness, dizziness or paresthesias  P: NEGATIVE for changes in mood or affect    OBJECTIVE:   /74  Pulse 92  Wt 217 lb (98.4 kg)  BMI 31.14 kg/m2  EXAM:  GENERAL: healthy, alert and no distress  EYES: Eyes grossly normal to inspection, PERRL and conjunctivae and sclerae normal  HENT: ear canals and TM's normal, nose and mouth  without ulcers or lesions  NECK: no adenopathy, no asymmetry, masses, or scars and thyroid normal to palpation  RESP: lungs clear to auscultation - no rales, rhonchi or wheezes  CV: Regularrate and rhythm.  No murmurs rubs or gallops heard.   ABDOMEN: soft, nontender, no hepatosplenomegaly, no masses and bowel sounds normal  Genitourinary: Patient declined BYRON  MS: no gross musculoskeletal defects noted, no edema.  Tender at 1st MCP bilaterally R>L with no obvious synovitis  SKIN: no suspicious lesions or rashes.  He does have thickened nails.  NEURO: Normal strength and tone, mentation intact and speech normal.  Monofilament sensation in tact but sensation feels abnormal to him.  Joint position sense in tact.  Reflexes are all normal.  PSYCH: mentation appears normal, affect normal/bright    Results for orders placed or performed in visit on 02/28/18   PSA GH   Result Value Ref Range    Prostate Specific Antigen 0.767 <3.100 ng/mL   CBC with platelets   Result Value Ref Range    WBC 6.9 4.0 - 11.0 10e9/L    RBC Count 4.79 4.4 - 5.9 10e12/L    Hemoglobin 14.9 13.3 - 17.7 g/dL    Hematocrit 44.5 40.0 - 53.0 %    MCV 93 78 - 100 fl    MCH 31.1 26.5 - 33.0 pg    MCHC 33.5 31.5 - 36.5 g/dL    RDW 12.8 10.0 - 15.0 %    Platelet Count 242 150 - 450 10e9/L   Vitamin B12   Result Value Ref Range    Vitamin B12 400 180 - 914 pg/mL   Folate   Result Value Ref Range    Folate 12.1 >5.21 ng/mL   Basic metabolic panel   Result Value Ref Range    Sodium 139 134 - 144 mmol/L    Potassium 3.9 3.5 - 5.1 mmol/L    Chloride 105 98 - 107 mmol/L    Carbon Dioxide 27 21 - 31 mmol/L    Anion Gap 7 3 - 14 mmol/L    Glucose 129 (H) 70 - 105 mg/dL    Urea Nitrogen 19 7 - 25 mg/dL    Creatinine 0.90 0.70 - 1.30 mg/dL    GFR Estimate 87 >60 mL/min/1.7m2    GFR Estimate If Black >90 >60 mL/min/1.7m2    Calcium 9.8 8.6 - 10.3 mg/dL   Lipid Profile (Chol, Trig, HDL, LDL calc) - FASTING   Result Value Ref Range    Cholesterol 197 <200 mg/dL     Triglycerides 280 (H) <150 mg/dL    HDL Cholesterol 42 23 - 92 mg/dL    LDL Cholesterol Calculated 99 <100 mg/dL    Non HDL Cholesterol 155 (H) <130 mg/dL         ASSESSMENT/PLAN:   1. High blood cholesterol  LDL less than 100.  Triglycerides elevated however.  Strongly suggest low carbohydrate and low saturated fat diet.  Suggest goal weight loss of 20 pounds over the next 6 months.  Recheck cholesterol in 6 months.  Also may be developing diabetes and that will be further investigated with A1c.  - simvastatin (ZOCOR) 20 MG tablet; Take 1 tablet (20 mg) by mouth At Bedtime  Dispense: 90 tablet; Refill: 3  - Lipid Profile (Chol, Trig, HDL, LDL calc) - FASTING    2. Benign prostatic hyperplasia with lower urinary tract symptoms, symptom details unspecified  I discussed risks and benefits of prostate screening.  He reports no change symptoms at present.  I reccommended PSA and BYRON, he declined BYRON but accepted PSA.  He understands risks of this approach.  Continue on Flomax.  Any change in symptoms and would suggest BYRON and/or referral to urology  - tamsulosin (FLOMAX) 0.4 MG capsule; Take 1 capsule (0.4 mg) by mouth daily  Dispense: 90 capsule; Refill: 3  - PSA GH    3. Urinary urgency  Patient reports symptoms well controlled.  Suggest urinalysis at next appointment along with A1c  - oxybutynin (DITROPAN-XL) 5 MG 24 hr tablet; Take 1 tablet (5 mg) by mouth daily  Dispense: 90 tablet; Refill: 3    4. Essential hypertension  Blood pressure well controlled.  Continue metoprolol  - metoprolol succinate (TOPROL-XL) 100 MG 24 hr tablet; Take 1 tablet (100 mg) by mouth daily  Dispense: 90 tablet; Refill: 3  - Basic metabolic panel    5. Onychomycosis due to dermatophyte  Discussed options.  May try Lamisil.  If ineffective may need to consider oral terbinafine.  - terbinafine (LAMISIL) 1 % cream; Apply topically 2 times daily  Dispense: 30 g; Refill: 1    6. Primary osteoarthritis of both hands  Patient has exam findings  "of degenerative arthritis.  Given his symptoms suggest x-rays.  No other historical evidence to suggest gout or rheumatologic disorder despite MCP being primary joint affected.  - XR Hand Left 2 Views; Future  - XR Hand Right 2 Views; Future    7. Peripheral polyneuropathy  We will obtain CBC, B12 and folate.  Patient's glucose did come back elevated.  Suggest A1c  - CBC with platelets  - Vitamin B12  - Folate    8. Routine history and physical examination of adult  Overall patient is doing well.  My main concern is that he may be developing diabetes.  Please see letter sent to patient.  Strongly suggest tobacco cessation and risk reduction of alcohol use.      COUNSELING:  Reviewed preventive health counseling, as reflected in patient instructions       Regular exercise       Healthy diet/nutrition       reports that he has been smoking Cigars.  He has been smoking about 5.00 packs per day. He has never used smokeless tobacco.  Tobacco Cessation Action Plan: Information offered: Patient not interested at this time  Estimated body mass index is 31.14 kg/(m^2) as calculated from the following:    Height as of 11/8/17: 5' 10\" (1.778 m).    Weight as of this encounter: 217 lb (98.4 kg).   Weight management plan noted, stable and monitoring    Counseling Resources:  ATP IV Guidelines  Pooled Cohorts Equation Calculator  FRAX Risk Assessment  ICSI Preventive Guidelines  Dietary Guidelines for Americans, 2010  USDA's MyPlate  ASA Prophylaxis  Lung CA Screening    Kang Flynn MD  [facility]  "

## 2018-03-08 ENCOUNTER — HOSPITAL ENCOUNTER (OUTPATIENT)
Dept: GENERAL RADIOLOGY | Facility: OTHER | Age: 58
Discharge: HOME | End: 2018-03-08
Attending: FAMILY MEDICINE | Admitting: FAMILY MEDICINE
Payer: COMMERCIAL

## 2018-03-08 ENCOUNTER — HOSPITAL ENCOUNTER (OUTPATIENT)
Dept: GENERAL RADIOLOGY | Facility: OTHER | Age: 58
End: 2018-03-08
Attending: FAMILY MEDICINE
Payer: COMMERCIAL

## 2018-03-08 DIAGNOSIS — M19.042 PRIMARY OSTEOARTHRITIS OF BOTH HANDS: ICD-10-CM

## 2018-03-08 DIAGNOSIS — M19.041 PRIMARY OSTEOARTHRITIS OF BOTH HANDS: ICD-10-CM

## 2018-03-08 PROCEDURE — 73120 X-RAY EXAM OF HAND: CPT | Mod: 50

## 2018-07-23 NOTE — PROGRESS NOTES
Patient Information     Patient Name  Watson Price MRN  [id number] Sex  Male   1960      Letter by Kang Flynn MD at      Author:  Kang Flynn MD Service:  (none) Author Type:  (none)    Filed:   Encounter Date:  2017 Status:  (Other)         Watson Price  [address] [facility] [address]    2017      Dear Mr. Price,    Your lab results are listed below and are acceptable/stable.  Please continue on your current medications.    Contact us with any questions.    I'm sending along your actual numbers so that you will have them for your general interest and your records.       Take Care,   Kang Flynn MD      Resulted Orders      LIPID PANEL (Collected: 2017 12:41 PM)      Result  Value Ref Range    CHOLESTEROL,TOTAL 185 <200 mg/dL    TRIGLYCERIDES 156 (H) <150 mg/dL    HDL CHOLESTEROL 45 23 - 92 mg/dL    NON-HDL CHOLESTEROL 140 <145 mg/dl    CHOL/HDL RATIO            4.11 <4.50                    LDL CHOLESTEROL 109 (H) <100 mg/dL    PATIENT STATUS            NOT GIVEN                   PSA TOTAL (DIAGNOSTIC) (Collected: 2017 12:41 PM)      Result  Value Ref Range    PSA TOTAL (DIAGNOSTIC) 0.894 <=3.100 ng/mL    Narrative      The percentage of Free PSA can be used to enhance the   differentiation of prostate cancer from benign prostatic  disease in subjects whose PSA levels are between 4.00 and  10.00 ng/mL.      The DXI PSA assay is a Chemiluminescent Assay.  Assay values obtained with different assay   methods cannot be used interchangeably due to differences  in assay methods and reagent specificity.       BASIC METABOLIC PANEL (Collected: 2017 12:41 PM)      Result  Value Ref Range    SODIUM 136 133 - 143 mmol/L    POTASSIUM 4.4 3.5 - 5.1 mmol/L    CHLORIDE 107 98 - 107 mmol/L    CO2,TOTAL 22 21 - 31 mmol/L    ANION GAP 7 5 - 18                    GLUCOSE 97 70 - 105 mg/dL    CALCIUM 9.7 8.6 - 10.3 mg/dL    BUN 16 7 - 25 mg/dL    CREATININE 0.82  0.70 - 1.30 mg/dL    BUN/CREAT RATIO           20                    GFR if African American >60 >60 ml/min/1.73m2    GFR if not African American >60 >60 ml/min/1.73m2

## 2018-07-23 NOTE — PROGRESS NOTES
Patient Information     Patient Name  Watson Price MRN  [id number] Sex  Male   1960      Letter by Kang Flynn MD at      Author:  Kang Flynn MD Service:  (none) Author Type:  (none)    Filed:   Encounter Date:  2018 Status:  (Other)           Watson Price  [address] [facility] [address]          2018    Dear Mr. Price:    This letter is to remind you that you are due for your annual exam with Kang Flynn MD. Your last comprehensive visit was more than 12 months ago.    A LIMITED refill of         oxybutynin XL (DITROPAN XL) 5 mg CR tablet      metoprolol succinate (TOPROL XL) 100 mg Sustained-Release tablet     has been called into your pharmacy. Additional refills require you to complete this appointment.    Please call the clinic at [phone] to schedule your appointment.    If you should require additional refills before your scheduled appointment, please contact your pharmacy and we will refill your medication until the date of your appointment.    If you are no longer seeing Kang Flynn MD for primary care, please call to let us know. Doing so will remove you from our call/contact list.      Thank you for choosing [facility] for your health care needs.    Sincerely,    Refill RN  [facility]

## 2019-02-12 ENCOUNTER — OFFICE VISIT (OUTPATIENT)
Dept: FAMILY MEDICINE | Facility: OTHER | Age: 59
End: 2019-02-12
Attending: FAMILY MEDICINE
Payer: COMMERCIAL

## 2019-02-12 VITALS
TEMPERATURE: 98 F | DIASTOLIC BLOOD PRESSURE: 82 MMHG | BODY MASS INDEX: 33.36 KG/M2 | HEIGHT: 70 IN | RESPIRATION RATE: 18 BRPM | HEART RATE: 76 BPM | WEIGHT: 233 LBS | SYSTOLIC BLOOD PRESSURE: 118 MMHG

## 2019-02-12 DIAGNOSIS — E78.00 HIGH BLOOD CHOLESTEROL: ICD-10-CM

## 2019-02-12 DIAGNOSIS — I10 ESSENTIAL HYPERTENSION: ICD-10-CM

## 2019-02-12 DIAGNOSIS — N40.1 BENIGN PROSTATIC HYPERPLASIA WITH LOWER URINARY TRACT SYMPTOMS, SYMPTOM DETAILS UNSPECIFIED: ICD-10-CM

## 2019-02-12 DIAGNOSIS — B35.1 ONYCHOMYCOSIS DUE TO DERMATOPHYTE: ICD-10-CM

## 2019-02-12 DIAGNOSIS — R39.15 URINARY URGENCY: ICD-10-CM

## 2019-02-12 DIAGNOSIS — R73.01 ELEVATED FASTING GLUCOSE: ICD-10-CM

## 2019-02-12 DIAGNOSIS — L21.9 SEBORRHEIC DERMATITIS: ICD-10-CM

## 2019-02-12 DIAGNOSIS — N52.9 ERECTILE DYSFUNCTION, UNSPECIFIED ERECTILE DYSFUNCTION TYPE: Primary | ICD-10-CM

## 2019-02-12 LAB
ANION GAP SERPL CALCULATED.3IONS-SCNC: 10 MMOL/L (ref 3–14)
BUN SERPL-MCNC: 15 MG/DL (ref 7–25)
CALCIUM SERPL-MCNC: 9.4 MG/DL (ref 8.6–10.3)
CHLORIDE SERPL-SCNC: 108 MMOL/L (ref 98–107)
CHOLEST SERPL-MCNC: 182 MG/DL
CO2 SERPL-SCNC: 23 MMOL/L (ref 21–31)
CREAT SERPL-MCNC: 0.8 MG/DL (ref 0.7–1.3)
GFR SERPL CREATININE-BSD FRML MDRD: >90 ML/MIN/{1.73_M2}
GLUCOSE SERPL-MCNC: 136 MG/DL (ref 70–105)
HDLC SERPL-MCNC: 47 MG/DL (ref 23–92)
LDLC SERPL CALC-MCNC: 79 MG/DL
NONHDLC SERPL-MCNC: 135 MG/DL
POTASSIUM SERPL-SCNC: 4.2 MMOL/L (ref 3.5–5.1)
PSA SERPL-ACNC: 0.81 NG/ML
SODIUM SERPL-SCNC: 141 MMOL/L (ref 134–144)
TRIGL SERPL-MCNC: 278 MG/DL

## 2019-02-12 PROCEDURE — 99214 OFFICE O/P EST MOD 30 MIN: CPT | Performed by: FAMILY MEDICINE

## 2019-02-12 PROCEDURE — 80061 LIPID PANEL: CPT | Performed by: FAMILY MEDICINE

## 2019-02-12 PROCEDURE — G0103 PSA SCREENING: HCPCS | Performed by: FAMILY MEDICINE

## 2019-02-12 PROCEDURE — 80048 BASIC METABOLIC PNL TOTAL CA: CPT | Performed by: FAMILY MEDICINE

## 2019-02-12 PROCEDURE — 36415 COLL VENOUS BLD VENIPUNCTURE: CPT | Performed by: FAMILY MEDICINE

## 2019-02-12 PROCEDURE — G0463 HOSPITAL OUTPT CLINIC VISIT: HCPCS

## 2019-02-12 RX ORDER — PRENATAL VIT 91/IRON/FOLIC/DHA 28-975-200
COMBINATION PACKAGE (EA) ORAL 2 TIMES DAILY
Qty: 30 G | Refills: 1 | Status: SHIPPED | OUTPATIENT
Start: 2019-02-12 | End: 2020-01-22

## 2019-02-12 RX ORDER — SILDENAFIL CITRATE 20 MG/1
TABLET ORAL
Qty: 30 TABLET | Refills: 3 | Status: SHIPPED | OUTPATIENT
Start: 2019-02-12 | End: 2020-01-22

## 2019-02-12 RX ORDER — TAMSULOSIN HYDROCHLORIDE 0.4 MG/1
0.4 CAPSULE ORAL DAILY
Qty: 90 CAPSULE | Refills: 3 | Status: SHIPPED | OUTPATIENT
Start: 2019-02-12 | End: 2020-01-21

## 2019-02-12 RX ORDER — KETOCONAZOLE 20 MG/G
CREAM TOPICAL DAILY
Qty: 60 G | Refills: 0 | Status: SHIPPED | OUTPATIENT
Start: 2019-02-12 | End: 2020-01-22

## 2019-02-12 RX ORDER — OXYBUTYNIN CHLORIDE 5 MG/1
5 TABLET, EXTENDED RELEASE ORAL DAILY
Qty: 90 TABLET | Refills: 3 | Status: SHIPPED | OUTPATIENT
Start: 2019-02-12 | End: 2020-01-22

## 2019-02-12 RX ORDER — METOPROLOL SUCCINATE 100 MG/1
100 TABLET, EXTENDED RELEASE ORAL DAILY
Qty: 90 TABLET | Refills: 3 | Status: SHIPPED | OUTPATIENT
Start: 2019-02-12 | End: 2020-01-22

## 2019-02-12 RX ORDER — SIMVASTATIN 20 MG
20 TABLET ORAL AT BEDTIME
Qty: 90 TABLET | Refills: 3 | Status: SHIPPED | OUTPATIENT
Start: 2019-02-12 | End: 2020-01-22

## 2019-02-12 ASSESSMENT — MIFFLIN-ST. JEOR: SCORE: 1878.13

## 2019-02-12 NOTE — LETTER
February 18, 2019      Watson Price  [address] [facility] [address]        Dear ,    We are writing to inform you of your test results.    Your PSA is stable.  Your cholesterol also is quite good.  Your fasting blood sugar was a bit elevated and I would like you to come back in to check an A1c which will give me a 90-day average of your blood sugar to determine if this was an isolated elevation or if you could be developing prediabetes or even diabetes.  The lab orders already placed and you can stop into the [facility] during the week between the hours of 8a-12p or between 1-4 pm whenever you are in town next to have your labs drawn.  You do not need to be fasting for these labs.    Resulted Orders   PSA Screen GH   Result Value Ref Range    PSA Screen 0.808 <3.100 ng/mL   Basic metabolic panel   Result Value Ref Range    Sodium 141 134 - 144 mmol/L    Potassium 4.2 3.5 - 5.1 mmol/L    Chloride 108 (H) 98 - 107 mmol/L    Carbon Dioxide 23 21 - 31 mmol/L    Anion Gap 10 3 - 14 mmol/L    Glucose 136 (H) 70 - 105 mg/dL    Urea Nitrogen 15 7 - 25 mg/dL    Creatinine 0.80 0.70 - 1.30 mg/dL    GFR Estimate >90 >60 mL/min/[1.73_m2]    GFR Estimate If Black >90 >60 mL/min/[1.73_m2]    Calcium 9.4 8.6 - 10.3 mg/dL   Lipid Profile   Result Value Ref Range    Cholesterol 182 <200 mg/dL    Triglycerides 278 (H) <150 mg/dL      Comment:      Borderline high:  150-199 mg/dl  High:             200-499 mg/dl  Very high:       >499 mg/dl      HDL Cholesterol 47 23 - 92 mg/dL    LDL Cholesterol Calculated 79 <100 mg/dL      Comment:      Desirable:       <100 mg/dl    Non HDL Cholesterol 135 (H) <130 mg/dL      Comment:      Above Desirable:  130-159 mg/dl  Borderline high:  160-189 mg/dl  High:             190-219 mg/dl  Very high:       >219 mg/dl         If you have any questions or concerns, please call the clinic at the number listed above.       Sincerely,        Kang Flynn MD

## 2019-02-12 NOTE — NURSING NOTE
No LMP for male patient.  Medication Reconciliation: complete    Lashell Alfaro LPN  2/12/2019 1:53 PM

## 2019-06-13 ENCOUNTER — OFFICE VISIT (OUTPATIENT)
Dept: FAMILY MEDICINE | Facility: OTHER | Age: 59
End: 2019-06-13
Attending: FAMILY MEDICINE
Payer: COMMERCIAL

## 2019-06-13 ENCOUNTER — HOSPITAL ENCOUNTER (OUTPATIENT)
Dept: GENERAL RADIOLOGY | Facility: OTHER | Age: 59
Discharge: HOME | End: 2019-06-13
Attending: FAMILY MEDICINE | Admitting: FAMILY MEDICINE
Payer: COMMERCIAL

## 2019-06-13 VITALS
WEIGHT: 224.8 LBS | BODY MASS INDEX: 32.26 KG/M2 | SYSTOLIC BLOOD PRESSURE: 116 MMHG | DIASTOLIC BLOOD PRESSURE: 80 MMHG | TEMPERATURE: 98 F | RESPIRATION RATE: 16 BRPM | HEART RATE: 80 BPM

## 2019-06-13 DIAGNOSIS — M79.645 PAIN OF FINGER OF LEFT HAND: ICD-10-CM

## 2019-06-13 DIAGNOSIS — M79.645 PAIN OF FINGER OF LEFT HAND: Primary | ICD-10-CM

## 2019-06-13 PROCEDURE — 99213 OFFICE O/P EST LOW 20 MIN: CPT | Performed by: FAMILY MEDICINE

## 2019-06-13 PROCEDURE — G0463 HOSPITAL OUTPT CLINIC VISIT: HCPCS

## 2019-06-13 PROCEDURE — G0463 HOSPITAL OUTPT CLINIC VISIT: HCPCS | Mod: 25

## 2019-06-13 PROCEDURE — 73140 X-RAY EXAM OF FINGER(S): CPT | Mod: LT

## 2019-06-13 ASSESSMENT — PAIN SCALES - GENERAL: PAINLEVEL: MODERATE PAIN (4)

## 2019-06-13 NOTE — NURSING NOTE
"Chief Complaint   Patient presents with     Finger      left index trigger finger       Initial /80   Pulse 80   Temp 98  F (36.7  C) (Temporal)   Resp 16   Wt 102 kg (224 lb 12.8 oz)   BMI 32.26 kg/m   Estimated body mass index is 32.26 kg/m  as calculated from the following:    Height as of 2/12/19: 1.778 m (5' 10\").    Weight as of this encounter: 102 kg (224 lb 12.8 oz).  Medication Reconciliation: complete    Michelle Rudd LPN     He has a trigger finger in his left index finger and it has really bothered him over the past year.  Michelle Rudd LPN..................6/13/2019   2:26 PM    "

## 2019-06-14 NOTE — PROGRESS NOTES
SUBJECTIVE:   Watson Price is a 58 year old male who presents to clinic today for the following health issues: Finger pain    Patient arrives here for left index finger pain.  He states is on his PIP joint where the pain occurs.  Seems to want a snap or lock but never does.  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  Tying learners.  Been going on since the winter.        Patient Active Problem List    Diagnosis Date Noted     Pain of finger of left hand 06/13/2019     Priority: Medium     Benign non-nodular prostatic hyperplasia with lower urinary tract symptoms 01/30/2017     Priority: Medium     S/P shoulder surgery 01/06/2016     Priority: Medium     Right rotator cuff tear 06/12/2015     Priority: Medium     Hypertension 12/09/2013     Priority: Medium     Migraine headache 08/23/2012     Priority: Medium     Past Medical History:   Diagnosis Date     Diverticulosis of large intestine without perforation or abscess without bleeding     1/3/2011, inactive icd-9 diagnosis auto replaced with icd-10, display name retained//mporz     Essential (primary) hypertension     No Comments Provided     Impingement syndrome of right shoulder     5/21/2015     Migraine without status migrainosus, not intractable     No Comments Provided     Nodular prostate with lower urinary tract symptoms     No Comments Provided     Other shoulder lesions, right shoulder     5/21/2015     Other specified postprocedural states     1/6/2016     Right rotator cuff tear     6/12/2015      Past Surgical History:   Procedure Laterality Date     COLONOSCOPY  01/03/2011    Next due-2021  hyperplastic polyp Dr Martinez     FRACTURE SURGERY Left 2009     Mandibular fracture with subsequent ORIF     OTHER SURGICAL HISTORY Right 01/06/2016     Current Outpatient Medications   Medication Sig Dispense Refill     ketoconazole (NIZORAL) 2 % external cream Apply topically daily 60 g 0     metoprolol succinate ER (TOPROL-XL) 100 MG 24 hr  tablet Take 1 tablet (100 mg) by mouth daily 90 tablet 3     oxybutynin ER (DITROPAN-XL) 5 MG 24 hr tablet Take 1 tablet (5 mg) by mouth daily 90 tablet 3     sildenafil (REVATIO) 20 MG tablet Take 20-60mg prior to sex (start with lower dose).  Do not take with nitrates.  Go to ED if erection lasts greater than 4 hours. 30 tablet 3     simvastatin (ZOCOR) 20 MG tablet Take 1 tablet (20 mg) by mouth At Bedtime 90 tablet 3     tamsulosin (FLOMAX) 0.4 MG capsule Take 1 capsule (0.4 mg) by mouth daily 90 capsule 3     terbinafine (LAMISIL) 1 % external cream Apply topically 2 times daily 30 g 1     No Known Allergies    Review of Systems     OBJECTIVE:     /80   Pulse 80   Temp 98  F (36.7  C) (Temporal)   Resp 16   Wt 102 kg (224 lb 12.8 oz)   BMI 32.26 kg/m    Body mass index is 32.26 kg/m .  Physical Exam   Constitutional: He appears well-developed.   Musculoskeletal: Normal range of motion. He exhibits tenderness.   There is tenderness along the DIP joint.  No obvious clicking locking or snapping present.  Degenerative changes present   Neurological: He is alert.       X-rays unremarkable    ASSESSMENT/PLAN:         1. Pain of finger of left hand  Possibly an early trigger finger.  Although he is not triggering yet.  Offered orthopedic referral versus observation.  Will monitor for now.  If he would like to pursue orthopedics he will call.  - XR Finger Left G/E 2 Views; Future      David Stahl MD  [facility] [place]

## 2019-11-21 ENCOUNTER — ALLIED HEALTH/NURSE VISIT (OUTPATIENT)
Dept: FAMILY MEDICINE | Facility: OTHER | Age: 59
End: 2019-11-21
Attending: FAMILY MEDICINE
Payer: COMMERCIAL

## 2019-11-21 DIAGNOSIS — Z23 NEED FOR PROPHYLACTIC VACCINATION AND INOCULATION AGAINST INFLUENZA: Primary | ICD-10-CM

## 2019-11-21 PROCEDURE — 90686 IIV4 VACC NO PRSV 0.5 ML IM: CPT

## 2019-11-21 PROCEDURE — 90471 IMMUNIZATION ADMIN: CPT

## 2019-11-21 PROCEDURE — G0008 ADMIN INFLUENZA VIRUS VAC: HCPCS

## 2019-11-21 NOTE — NURSING NOTE
Immunization Documentation    Prior to Immunization administration, verified patients identity using patient's name and date of birth. Please see IMMUNIZATIONS  and order for additional information.  Patient / Parent instructed to remain in clinic for 15 minutes and report any adverse reaction to staff immediately.    Was entire vial of medication used? Yes  Vial/Syringe: Anabela Arellano LPN  11/21/2019   2:40 PM

## 2019-12-30 DIAGNOSIS — N40.1 BENIGN PROSTATIC HYPERPLASIA WITH LOWER URINARY TRACT SYMPTOMS, SYMPTOM DETAILS UNSPECIFIED: ICD-10-CM

## 2019-12-31 RX ORDER — TAMSULOSIN HYDROCHLORIDE 0.4 MG/1
0.4 CAPSULE ORAL DAILY
Qty: 90 CAPSULE | Refills: 3 | OUTPATIENT
Start: 2019-12-31

## 2019-12-31 NOTE — TELEPHONE ENCOUNTER
Redundant refill request refused: Too soon:    tamsulosin (FLOMAX) 0.4 MG capsule 90 capsule 3 2/12/2019  No   Sig - Route: Take 1 capsule (0.4 mg) by mouth daily - Oral   Sent to pharmacy as: tamsulosin (FLOMAX) 0.4 MG capsule   Class: E-Prescribe   Order: [id number]   E-Prescribing Status: Receipt confirmed by pharmacy (2/12/2019  2:20 PM CST)     [id number] - [place], [place] - [address]     Unable to complete prescription refill per RN Medication Refill Policy. Cathy Pinto RN .............. 12/31/2019  11:12 AM

## 2020-01-21 DIAGNOSIS — N40.1 BENIGN PROSTATIC HYPERPLASIA WITH LOWER URINARY TRACT SYMPTOMS, SYMPTOM DETAILS UNSPECIFIED: ICD-10-CM

## 2020-01-21 RX ORDER — TAMSULOSIN HYDROCHLORIDE 0.4 MG/1
0.4 CAPSULE ORAL DAILY
Qty: 90 CAPSULE | Refills: 3 | Status: SHIPPED | OUTPATIENT
Start: 2020-01-21 | End: 2020-01-22

## 2020-01-21 NOTE — TELEPHONE ENCOUNTER
[id number] sent Rx request for the following:      tamsulosin (FLOMAX) 0.4 MG capsule  Sig: Take 1 capsule (0.4 mg) by mouth daily     Last Prescription Date:   2/12/2019  Last Fill Qty/Refills:         90, R-3    Last Office Visit:              6/13/2019   Future Office visit:           1/22/2020    Requested Prescriptions   Pending Prescriptions Disp Refills     tamsulosin (FLOMAX) 0.4 MG capsule 90 capsule 3     Sig: Take 1 capsule (0.4 mg) by mouth daily       Alpha Blockers Failed - 1/21/2020  1:23 PM        Failed - Patient does not have Tadalafil, Vardenafil, or Sildenafil on their medication list     Unable to complete prescription refill per RN Medication Refill Policy.................... Yovani Deng RN ....................  1/21/2020   2:55 PM

## 2020-01-22 ENCOUNTER — OFFICE VISIT (OUTPATIENT)
Dept: FAMILY MEDICINE | Facility: OTHER | Age: 60
End: 2020-01-22
Attending: FAMILY MEDICINE
Payer: COMMERCIAL

## 2020-01-22 VITALS
DIASTOLIC BLOOD PRESSURE: 76 MMHG | RESPIRATION RATE: 16 BRPM | BODY MASS INDEX: 32.21 KG/M2 | HEART RATE: 89 BPM | HEIGHT: 70 IN | WEIGHT: 225 LBS | OXYGEN SATURATION: 97 % | SYSTOLIC BLOOD PRESSURE: 128 MMHG | TEMPERATURE: 98.6 F

## 2020-01-22 DIAGNOSIS — B35.1 ONYCHOMYCOSIS DUE TO DERMATOPHYTE: ICD-10-CM

## 2020-01-22 DIAGNOSIS — N52.9 ERECTILE DYSFUNCTION, UNSPECIFIED ERECTILE DYSFUNCTION TYPE: ICD-10-CM

## 2020-01-22 DIAGNOSIS — E78.00 HIGH BLOOD CHOLESTEROL: ICD-10-CM

## 2020-01-22 DIAGNOSIS — I10 ESSENTIAL HYPERTENSION: Primary | ICD-10-CM

## 2020-01-22 DIAGNOSIS — N40.1 BENIGN PROSTATIC HYPERPLASIA WITH LOWER URINARY TRACT SYMPTOMS, SYMPTOM DETAILS UNSPECIFIED: ICD-10-CM

## 2020-01-22 DIAGNOSIS — L21.9 SEBORRHEIC DERMATITIS: ICD-10-CM

## 2020-01-22 DIAGNOSIS — R39.15 URINARY URGENCY: ICD-10-CM

## 2020-01-22 DIAGNOSIS — Z12.11 SPECIAL SCREENING FOR MALIGNANT NEOPLASMS, COLON: ICD-10-CM

## 2020-01-22 LAB
ALBUMIN SERPL-MCNC: 4.5 G/DL (ref 3.5–5.7)
ALP SERPL-CCNC: 45 U/L (ref 34–104)
ALT SERPL W P-5'-P-CCNC: 35 U/L (ref 7–52)
ANION GAP SERPL CALCULATED.3IONS-SCNC: 9 MMOL/L (ref 3–14)
AST SERPL W P-5'-P-CCNC: 20 U/L (ref 13–39)
BILIRUB SERPL-MCNC: 0.4 MG/DL (ref 0.3–1)
BUN SERPL-MCNC: 15 MG/DL (ref 7–25)
CALCIUM SERPL-MCNC: 9.6 MG/DL (ref 8.6–10.3)
CHLORIDE SERPL-SCNC: 105 MMOL/L (ref 98–107)
CO2 SERPL-SCNC: 24 MMOL/L (ref 21–31)
CREAT SERPL-MCNC: 0.94 MG/DL (ref 0.7–1.3)
GFR SERPL CREATININE-BSD FRML MDRD: 82 ML/MIN/{1.73_M2}
GLUCOSE SERPL-MCNC: 98 MG/DL (ref 70–105)
POTASSIUM SERPL-SCNC: 4.5 MMOL/L (ref 3.5–5.1)
PROT SERPL-MCNC: 7.6 G/DL (ref 6.4–8.9)
PSA SERPL-MCNC: 0.97 NG/ML
SODIUM SERPL-SCNC: 138 MMOL/L (ref 134–144)
URATE SERPL-MCNC: 5.7 MG/DL (ref 4.4–7.6)

## 2020-01-22 PROCEDURE — 84153 ASSAY OF PSA TOTAL: CPT | Mod: ZL | Performed by: FAMILY MEDICINE

## 2020-01-22 PROCEDURE — 99214 OFFICE O/P EST MOD 30 MIN: CPT | Performed by: FAMILY MEDICINE

## 2020-01-22 PROCEDURE — 36415 COLL VENOUS BLD VENIPUNCTURE: CPT | Mod: ZL | Performed by: FAMILY MEDICINE

## 2020-01-22 PROCEDURE — 84550 ASSAY OF BLOOD/URIC ACID: CPT | Mod: ZL | Performed by: FAMILY MEDICINE

## 2020-01-22 PROCEDURE — 80053 COMPREHEN METABOLIC PANEL: CPT | Mod: ZL | Performed by: FAMILY MEDICINE

## 2020-01-22 PROCEDURE — G0463 HOSPITAL OUTPT CLINIC VISIT: HCPCS

## 2020-01-22 RX ORDER — SILDENAFIL CITRATE 20 MG/1
TABLET ORAL
Qty: 30 TABLET | Refills: 3 | Status: SHIPPED | OUTPATIENT
Start: 2020-01-22 | End: 2020-03-24

## 2020-01-22 RX ORDER — KETOCONAZOLE 20 MG/G
CREAM TOPICAL DAILY
Qty: 60 G | Refills: 4 | Status: SHIPPED | OUTPATIENT
Start: 2020-01-22 | End: 2022-03-03

## 2020-01-22 RX ORDER — PRENATAL VIT 91/IRON/FOLIC/DHA 28-975-200
COMBINATION PACKAGE (EA) ORAL 2 TIMES DAILY
Qty: 30 G | Refills: 1 | Status: SHIPPED | OUTPATIENT
Start: 2020-01-22 | End: 2022-03-03

## 2020-01-22 RX ORDER — TAMSULOSIN HYDROCHLORIDE 0.4 MG/1
0.8 CAPSULE ORAL DAILY
Qty: 180 CAPSULE | Refills: 3 | Status: SHIPPED | OUTPATIENT
Start: 2020-01-22 | End: 2021-02-01

## 2020-01-22 RX ORDER — OXYBUTYNIN CHLORIDE 5 MG/1
5 TABLET, EXTENDED RELEASE ORAL DAILY
Qty: 90 TABLET | Refills: 3 | Status: SHIPPED | OUTPATIENT
Start: 2020-01-22 | End: 2020-01-22

## 2020-01-22 RX ORDER — TAMSULOSIN HYDROCHLORIDE 0.4 MG/1
0.4 CAPSULE ORAL DAILY
Qty: 90 CAPSULE | Refills: 3 | Status: SHIPPED | OUTPATIENT
Start: 2020-01-22 | End: 2021-02-19

## 2020-01-22 RX ORDER — METOPROLOL SUCCINATE 100 MG/1
100 TABLET, EXTENDED RELEASE ORAL DAILY
Qty: 90 TABLET | Refills: 3 | Status: SHIPPED | OUTPATIENT
Start: 2020-01-22 | End: 2021-02-01

## 2020-01-22 RX ORDER — SIMVASTATIN 20 MG
20 TABLET ORAL AT BEDTIME
Qty: 90 TABLET | Refills: 3 | Status: SHIPPED | OUTPATIENT
Start: 2020-01-22 | End: 2021-02-01

## 2020-01-22 ASSESSMENT — ENCOUNTER SYMPTOMS
HEADACHES: 0
FEVER: 0
SLEEP DISTURBANCE: 0
FATIGUE: 0
FREQUENCY: 1
DIFFICULTY URINATING: 1
ARTHRALGIAS: 1

## 2020-01-22 ASSESSMENT — ANXIETY QUESTIONNAIRES
IF YOU CHECKED OFF ANY PROBLEMS ON THIS QUESTIONNAIRE, HOW DIFFICULT HAVE THESE PROBLEMS MADE IT FOR YOU TO DO YOUR WORK, TAKE CARE OF THINGS AT HOME, OR GET ALONG WITH OTHER PEOPLE: NOT DIFFICULT AT ALL
6. BECOMING EASILY ANNOYED OR IRRITABLE: NOT AT ALL
GAD7 TOTAL SCORE: 0
5. BEING SO RESTLESS THAT IT IS HARD TO SIT STILL: NOT AT ALL
3. WORRYING TOO MUCH ABOUT DIFFERENT THINGS: NOT AT ALL
7. FEELING AFRAID AS IF SOMETHING AWFUL MIGHT HAPPEN: NOT AT ALL
1. FEELING NERVOUS, ANXIOUS, OR ON EDGE: NOT AT ALL
2. NOT BEING ABLE TO STOP OR CONTROL WORRYING: NOT AT ALL

## 2020-01-22 ASSESSMENT — PAIN SCALES - GENERAL: PAINLEVEL: MODERATE PAIN (5)

## 2020-01-22 ASSESSMENT — MIFFLIN-ST. JEOR: SCORE: 1836.84

## 2020-01-22 ASSESSMENT — PATIENT HEALTH QUESTIONNAIRE - PHQ9: 5. POOR APPETITE OR OVEREATING: NOT AT ALL

## 2020-01-22 NOTE — NURSING NOTE
"Coming in for a medication check up, also check the right big toe    Chief Complaint   Patient presents with     Recheck Medication     check up       Initial /76   Pulse 89   Temp 98.6  F (37  C) (Tympanic)   Resp 16   Ht 1.778 m (5' 10\")   Wt 102.1 kg (225 lb)   SpO2 97%   BMI 32.28 kg/m   Estimated body mass index is 32.28 kg/m  as calculated from the following:    Height as of this encounter: 1.778 m (5' 10\").    Weight as of this encounter: 102.1 kg (225 lb).  Medication Reconciliation: complete    Anna David LPN  "

## 2020-01-22 NOTE — PROGRESS NOTES
SUBJECTIVE:   Watson Price is a 59 year old male who presents to clinic today for the following health issues:    HPI  Follow up on meds, hypertension and urinary symptoms.  He has lots of nocturia, 3-4 times often. Has had this for years.  Was on flomax, and then added on oxybutynin, not sure if it has helped much at all.  Has not met with a urologist.  SA was normal 1 year ago, 0.8.  Very weak stream now too.    Had significant headaches after a jaw fracture.  Was given Toprol, which cured his headaches and wants to remain on it.    Has significant pain in right 1st toe. For nearly 1 year.  Worried he has gout.  Pain is severe at times, even with sheets touching it at night.  Drinks 1 case of beer and 1 qt radha per week.    Patient Active Problem List    Diagnosis Date Noted     Pain of finger of left hand 06/13/2019     Priority: Medium     Benign non-nodular prostatic hyperplasia with lower urinary tract symptoms 01/30/2017     Priority: Medium     S/P shoulder surgery 01/06/2016     Priority: Medium     Right rotator cuff tear 06/12/2015     Priority: Medium     Hypertension 12/09/2013     Priority: Medium     Migraine headache 08/23/2012     Priority: Medium     Past Surgical History:   Procedure Laterality Date     COLONOSCOPY  01/03/2011    Next due-2021  hyperplastic polyp Dr Martinez     FRACTURE SURGERY Left 2009     Mandibular fracture with subsequent ORIF     OTHER SURGICAL HISTORY Right 01/06/2016     Social History     Tobacco Use     Smoking status: Current Every Day Smoker     Packs/day: 5.00     Types: Cigars     Smokeless tobacco: Never Used   Substance Use Topics     Alcohol use: Yes     Alcohol/week: 24.0 standard drinks     Comment: Alcoholic Drinks/day: case of beer per week     Current Outpatient Medications   Medication Sig Dispense Refill     ketoconazole (NIZORAL) 2 % external cream Apply topically daily 60 g 4     metoprolol succinate ER (TOPROL-XL) 100 MG 24 hr tablet Take 1 tablet  "(100 mg) by mouth daily 90 tablet 3     oxybutynin ER (DITROPAN-XL) 5 MG 24 hr tablet Take 1 tablet (5 mg) by mouth daily 90 tablet 3     sildenafil (REVATIO) 20 MG tablet Take 20-60mg prior to sex (start with lower dose).  Do not take with nitrates.  Go to ED if erection lasts greater than 4 hours. 30 tablet 3     simvastatin (ZOCOR) 20 MG tablet Take 1 tablet (20 mg) by mouth At Bedtime 90 tablet 3     tamsulosin (FLOMAX) 0.4 MG capsule Take 1 capsule (0.4 mg) by mouth daily 90 capsule 3     terbinafine (LAMISIL) 1 % external cream Apply topically 2 times daily 30 g 1     No Known Allergies    Review of Systems   Constitutional: Negative for fatigue and fever.   Genitourinary: Positive for difficulty urinating, frequency and impotence.   Musculoskeletal: Positive for arthralgias.   Skin: Negative for rash.   Neurological: Negative for headaches.   Psychiatric/Behavioral: Negative for sleep disturbance.        OBJECTIVE:     /76   Pulse 89   Temp 98.6  F (37  C) (Tympanic)   Resp 16   Ht 1.778 m (5' 10\")   Wt 102.1 kg (225 lb)   SpO2 97%   BMI 32.28 kg/m    Body mass index is 32.28 kg/m .  Physical Exam  Constitutional:       Appearance: Normal appearance.   Cardiovascular:      Rate and Rhythm: Normal rate and regular rhythm.   Pulmonary:      Effort: Pulmonary effort is normal. No respiratory distress.      Breath sounds: Normal breath sounds. No stridor.   Musculoskeletal:      Comments: Right 1st toe with  No pain over the joints.  Mild pain at medial base of nail plate.    Neurological:      General: No focal deficit present.      Mental Status: He is alert and oriented to person, place, and time.   Psychiatric:         Mood and Affect: Mood normal.         Behavior: Behavior normal.         Thought Content: Thought content normal.             ASSESSMENT/PLAN:     Regarding toe pain, doubt gout given it is not in joint, but will get a uric acid.  Is possibly from the onychomycosis.   Regarding his " urinary symptoms, will stop the oxybutynin and have him double the flomax to 0.8 and consult with urology. I asked him to stop smoking, but does not meet the screening cutoff (smokes cigars so cannot calculate ppd).  advised no EtOH too.  Not ready to cut back      ICD-10-CM    1. Essential hypertension I10 metoprolol succinate ER (TOPROL-XL) 100 MG 24 hr tablet     Comprehensive Metabolic Panel     Uric acid   2. Onychomycosis due to dermatophyte B35.1 terbinafine (LAMISIL) 1 % external cream   3. High blood cholesterol E78.00 simvastatin (ZOCOR) 20 MG tablet   4. Urinary urgency R39.15 DISCONTINUED: oxybutynin ER (DITROPAN-XL) 5 MG 24 hr tablet   5. Seborrheic dermatitis L21.9 ketoconazole (NIZORAL) 2 % external cream   6. Erectile dysfunction, unspecified erectile dysfunction type N52.9 sildenafil (REVATIO) 20 MG tablet   7. Benign prostatic hyperplasia with lower urinary tract symptoms, symptom details unspecified N40.1 tamsulosin (FLOMAX) 0.4 MG capsule     tamsulosin (FLOMAX) 0.4 MG capsule     UROLOGY ADULT REFERRAL     Prostate Specific Antigen   8. Special screening for malignant neoplasms, colon Z12.11 GASTROENTEROLOGY ADULT REF PROCEDURE ONLY         Tal Crockett MD  [facility] AND [facility]

## 2020-01-22 NOTE — LETTER
January 23, 2020      Watson Price  [address] [facility] [address]        Dear Watson,     This is all good, unlikely gout based on this lab.     Results for orders placed or performed in visit on 01/22/20   Uric acid     Status: None   Result Value Ref Range    Uric Acid 5.7 4.4 - 7.6 mg/dL   Prostate Specific Antigen     Status: None   Result Value Ref Range    Prostate Specific Antigen 0.971 <3.100 ng/mL   Comprehensive Metabolic Panel     Status: None   Result Value Ref Range    Sodium 138 134 - 144 mmol/L    Potassium 4.5 3.5 - 5.1 mmol/L    Chloride 105 98 - 107 mmol/L    Carbon Dioxide 24 21 - 31 mmol/L    Anion Gap 9 3 - 14 mmol/L    Glucose 98 70 - 105 mg/dL    Urea Nitrogen 15 7 - 25 mg/dL    Creatinine 0.94 0.70 - 1.30 mg/dL    GFR Estimate 82 >60 mL/min/[1.73_m2]    GFR Estimate If Black >90 >60 mL/min/[1.73_m2]    Calcium 9.6 8.6 - 10.3 mg/dL    Bilirubin Total 0.4 0.3 - 1.0 mg/dL    Albumin 4.5 3.5 - 5.7 g/dL    Protein Total 7.6 6.4 - 8.9 g/dL    Alkaline Phosphatase 45 34 - 104 U/L    ALT 35 7 - 52 U/L    AST 20 13 - 39 U/L             Sincerely,        Tal Crockett MD

## 2020-01-23 ENCOUNTER — HOSPITAL ENCOUNTER (OUTPATIENT)
Facility: OTHER | Age: 60
End: 2020-01-23
Attending: SURGERY | Admitting: SURGERY
Payer: COMMERCIAL

## 2020-01-23 ASSESSMENT — ANXIETY QUESTIONNAIRES: GAD7 TOTAL SCORE: 0

## 2020-01-23 NOTE — TELEPHONE ENCOUNTER
Screening Questions for the Scheduling of Screening Colonoscopies   (If Colonoscopy is diagnostic, Provider should review the chart before scheduling.)  Are you younger than 50 or older than 80?  NO   Do you take aspirin or fish oil?  YES - ASPIRIN   (if yes, tell patient to stop 1 week prior to Colonoscopy)  Do you take warfarin (Coumadin), clopidogrel (Plavix), apixaban (Eliquis), dabigatram (Pradaxa), rivaroxaban (Xarelto) or any blood thinner? NO   Do you use oxygen at home?  NO   Do you have kidney disease? NO   Are you on dialysis? NO   Have you had a stroke or heart attack in the last year? NO   Have you had a stent in your heart or any blood vessel in the last year? NO   Have you had a transplant of any organ? NO   Have you had a colonoscopy or upper endoscopy (EGD) before? YES          When?  10 YRS   Date of scheduled Colonoscopy. 02/25/2020  Provider  [facility]   [facility]

## 2020-01-24 RX ORDER — BISACODYL 5 MG
TABLET, DELAYED RELEASE (ENTERIC COATED) ORAL
Qty: 2 TABLET | Refills: 0 | Status: CANCELLED | OUTPATIENT
Start: 2020-01-24

## 2020-01-24 RX ORDER — POLYETHYLENE GLYCOL 3350, SODIUM CHLORIDE, SODIUM BICARBONATE, POTASSIUM CHLORIDE 420; 11.2; 5.72; 1.48 G/4L; G/4L; G/4L; G/4L
4000 POWDER, FOR SOLUTION ORAL ONCE
Qty: 4000 ML | Refills: 0 | Status: CANCELLED | OUTPATIENT
Start: 2020-01-24 | End: 2020-01-24

## 2020-02-03 ENCOUNTER — OFFICE VISIT (OUTPATIENT)
Dept: UROLOGY | Facility: OTHER | Age: 60
End: 2020-02-03
Attending: FAMILY MEDICINE
Payer: COMMERCIAL

## 2020-02-03 VITALS
RESPIRATION RATE: 16 BRPM | SYSTOLIC BLOOD PRESSURE: 126 MMHG | DIASTOLIC BLOOD PRESSURE: 80 MMHG | WEIGHT: 243 LBS | BODY MASS INDEX: 34.87 KG/M2 | HEART RATE: 80 BPM

## 2020-02-03 DIAGNOSIS — N40.1 BENIGN PROSTATIC HYPERPLASIA WITH URINARY FREQUENCY: Primary | ICD-10-CM

## 2020-02-03 DIAGNOSIS — R35.0 BENIGN PROSTATIC HYPERPLASIA WITH URINARY FREQUENCY: Primary | ICD-10-CM

## 2020-02-03 LAB
ALBUMIN UR-MCNC: NEGATIVE MG/DL
APPEARANCE UR: CLEAR
BILIRUB UR QL STRIP: NEGATIVE
COLOR UR AUTO: NORMAL
GLUCOSE UR STRIP-MCNC: NEGATIVE MG/DL
HGB UR QL STRIP: NEGATIVE
KETONES UR STRIP-MCNC: NEGATIVE MG/DL
LEUKOCYTE ESTERASE UR QL STRIP: NEGATIVE
NITRATE UR QL: NEGATIVE
PH UR STRIP: 5.5 PH (ref 5–7)
SOURCE: NORMAL
SP GR UR STRIP: 1.02 (ref 1–1.03)
UROBILINOGEN UR STRIP-MCNC: NORMAL MG/DL (ref 0–2)

## 2020-02-03 PROCEDURE — 99203 OFFICE O/P NEW LOW 30 MIN: CPT | Performed by: UROLOGY

## 2020-02-03 PROCEDURE — 81003 URINALYSIS AUTO W/O SCOPE: CPT | Mod: ZL | Performed by: UROLOGY

## 2020-02-03 PROCEDURE — G0463 HOSPITAL OUTPT CLINIC VISIT: HCPCS | Mod: 25

## 2020-02-03 PROCEDURE — 51798 US URINE CAPACITY MEASURE: CPT | Performed by: UROLOGY

## 2020-02-03 ASSESSMENT — PAIN SCALES - GENERAL: PAINLEVEL: NO PAIN (0)

## 2020-02-03 NOTE — NURSING NOTE
Pt presents to clinic for BPH with MARY ELLEN consutl    Review of Systems:    Weight loss:    No     Recent fever/chills:  No   Night sweats:   Yes  Current skin rash:  No   Recent hair loss:  No  Heat intolerance:  No   Cold intolerance:  No  Chest pain:   No   Palpitations:   No  Shortness of breath:  No   Wheezing:   No  Constipation:    No   Diarrhea:   No   Nausea:   No   Vomiting:   No   Kidney/side pain:  No   Back pain:   No  Frequent headaches:  No   Dizziness:     No  Leg swelling:   No   Calf pain:    No    Parents, brothers or sisters with history of kidney cancer:   No  Parents, brothers or sisters with history of bladder cancer: No  Father or brother with history of prostate cancer:  No    Post-Void Residual  A post-void residual was measured by ultrasonic bladder scanner.  118 mL

## 2020-02-03 NOTE — PROGRESS NOTES
I was asked to see this patient by Tal Crockett MD and provide my opinion about the following:  Nocturia    Type of Visit  Consult    Chief Complaint  Nocturia    HPI  Mr. Price is a 59 year old male who presents with urinary symptoms.  About 2 years ago the patient started Flomax for obstructive symptoms.  This initially improved his symptoms however about 1 year ago he noticed worsening symptoms.  At this time he was started on oxybutynin and he took the medication for about 1 year.  This was discontinued recently when he saw his PCP.  At that time the Flomax dose was doubled to 0.8 mg once daily.  The patient reports that his nocturia, which is his most bothersome symptom, improved from 4 to twice nightly.  The patient denies gross hematuria and dysuria.      Past Medical History  He  has a past medical history of Diverticulosis of large intestine without perforation or abscess without bleeding, Essential (primary) hypertension, Impingement syndrome of right shoulder, Migraine without status migrainosus, not intractable, Nodular prostate with lower urinary tract symptoms, Other shoulder lesions, right shoulder, Other specified postprocedural states, and Right rotator cuff tear.  Patient Active Problem List   Diagnosis     Benign non-nodular prostatic hyperplasia with lower urinary tract symptoms     Hypertension     Migraine headache     Right rotator cuff tear     S/P shoulder surgery     Pain of finger of left hand       Past Surgical History  He  has a past surgical history that includes fracture surgery (Left, 2009); Colonoscopy (01/03/2011); and OTHER SURGICAL HISTORY (Right, 01/06/2016).    Medications  He has a current medication list which includes the following prescription(s): ketoconazole, metoprolol succinate er, sildenafil, simvastatin, tamsulosin, tamsulosin, and terbinafine.    Allergies  No Known Allergies    Social History  He  reports that he has been smoking cigars. He has been smoking about  5.00 packs per day. He has never used smokeless tobacco. He reports current alcohol use of about 24.0 standard drinks of alcohol per week. He reports that he does not use drugs.  No drug abuse.    Family History  Family History   Problem Relation Age of Onset     Other - See Comments Mother 76        Stroke     Other - See Comments Father         Macular degeneration.     Heart Disease Father         Heart Disease,Possible CAD       Review of Systems  I personally reviewed the ROS with the patient.    Nursing Notes:   Shahla Rojas LPN  2/3/2020  3:04 PM  Addendum  Pt presents to clinic for BPH with MARY ELLEN consutl    Review of Systems:    Weight loss:    No     Recent fever/chills:  No   Night sweats:   Yes  Current skin rash:  No   Recent hair loss:  No  Heat intolerance:  No   Cold intolerance:  No  Chest pain:   No   Palpitations:   No  Shortness of breath:  No   Wheezing:   No  Constipation:    No   Diarrhea:   No   Nausea:   No   Vomiting:   No   Kidney/side pain:  No   Back pain:   No  Frequent headaches:  No   Dizziness:     No  Leg swelling:   No   Calf pain:    No    Parents, brothers or sisters with history of kidney cancer:   No  Parents, brothers or sisters with history of bladder cancer: No  Father or brother with history of prostate cancer:  No    Post-Void Residual  A post-void residual was measured by ultrasonic bladder scanner.  118 mL        Physical Exam  Vitals:    02/03/20 1459   BP: 126/80   BP Location: Right arm   Patient Position: Sitting   Cuff Size: Adult Regular   Pulse: 80   Resp: 16   Weight: 110.2 kg (243 lb)     Constitutional: No acute distress.  Alert and cooperative   Head: NCAT  Eyes: Conjunctivae normal  Cardiovascular: Regular rate.  Pulmonary/Chest: Respirations are even and non-labored bilaterally, no audible wheezing  Abdominal: Soft. No distension, tenderness, masses or guarding.   Neurological: A + O x 3.  Cranial Nerves II-XII grossly intact.  Extremities: BRIAN x 4,  Warm. No clubbing.  No cyanosis.    Skin: Pink, warm and dry.  No visible rashes noted.  Psychiatric:  Normal mood and affect  Back:  No left CVA tenderness.  No right CVA tenderness.  Genitourinary:  Nonpalpable bladder    Labs  Results for orders placed or performed in visit on 02/03/20   UA reflex to Microscopic     Status: None   Result Value Ref Range    Color Urine Light Yellow     Appearance Urine Clear     Glucose Urine Negative NEG^Negative mg/dL    Bilirubin Urine Negative NEG^Negative    Ketones Urine Negative NEG^Negative mg/dL    Specific Gravity Urine 1.020 1.003 - 1.035    Blood Urine Negative NEG^Negative    pH Urine 5.5 5.0 - 7.0 pH    Protein Albumin Urine Negative NEG^Negative mg/dL    Urobilinogen mg/dL Normal 0.0 - 2.0 mg/dL    Nitrite Urine Negative NEG^Negative    Leukocyte Esterase Urine Negative NEG^Negative    Source Midstream Urine      Results for ADRIAN LARKIN (MRN [id number]) as of 2/3/2020 15:09   1/22/2020 12:37   Prostate Specific Antigen 0.971     Results for ADRIAN LARKIN (MRN [id number]) as of 2/3/2020 15:09   1/22/2020 12:37   Creatinine 0.94     Post-Void Residual  A post-void residual was measured by ultrasonic bladder scanner.  118 mL today    Assessment  Mr. Larkin is a 59 year old male who presents with BPH managed with Flomax.  I reviewed the recent UA which was negative for infection and blood.  The patient's PSA is completely normal.    We discussed finasteride as an option but given his PSA value it would likely not be beneficial.  I agree with discontinuing the anticholinergic.  It was likely causing some degree of increased residual paradoxically leading to more frequency.    Plan  Continue Flomax 0.4mg BID  Follow-up with me if symptoms progress otherwise as needed

## 2020-02-27 ENCOUNTER — OFFICE VISIT (OUTPATIENT)
Dept: FAMILY MEDICINE | Facility: OTHER | Age: 60
End: 2020-02-27
Attending: FAMILY MEDICINE
Payer: COMMERCIAL

## 2020-02-27 VITALS
RESPIRATION RATE: 18 BRPM | BODY MASS INDEX: 33.9 KG/M2 | TEMPERATURE: 98.6 F | HEART RATE: 83 BPM | OXYGEN SATURATION: 96 % | WEIGHT: 236.8 LBS | DIASTOLIC BLOOD PRESSURE: 80 MMHG | HEIGHT: 70 IN | SYSTOLIC BLOOD PRESSURE: 102 MMHG

## 2020-02-27 DIAGNOSIS — L72.3 SEBACEOUS CYST: Primary | ICD-10-CM

## 2020-02-27 PROCEDURE — 10060 I&D ABSCESS SIMPLE/SINGLE: CPT | Performed by: FAMILY MEDICINE

## 2020-02-27 PROCEDURE — 10160 PNXR ASPIR ABSC HMTMA BULLA: CPT | Performed by: FAMILY MEDICINE

## 2020-02-27 PROCEDURE — G0463 HOSPITAL OUTPT CLINIC VISIT: HCPCS

## 2020-02-27 ASSESSMENT — MIFFLIN-ST. JEOR: SCORE: 1890.37

## 2020-02-27 ASSESSMENT — PAIN SCALES - GENERAL: PAINLEVEL: NO PAIN (0)

## 2020-02-27 NOTE — NURSING NOTE
Patient presents to the clinic for a cyst on his back as well as two dry spots on his left shoulder.  Medication Reconciliation Completed.    Brain Francis LPN  2/27/2020 2:51 PM

## 2020-02-27 NOTE — PROGRESS NOTES
SUBJECTIVE:   Watson Price is a 59 year old male who presents to clinic today for the following health issues:    HPI  Cyst on back.  Has had for years, was offered prior I and D, but felt it was not needed.  A few days ago it started to drain, wife has been expressing whit material, without significant odor.    Patient Active Problem List    Diagnosis Date Noted     Pain of finger of left hand 06/13/2019     Priority: Medium     Benign non-nodular prostatic hyperplasia with lower urinary tract symptoms 01/30/2017     Priority: Medium     S/P shoulder surgery 01/06/2016     Priority: Medium     Right rotator cuff tear 06/12/2015     Priority: Medium     Hypertension 12/09/2013     Priority: Medium     Migraine headache 08/23/2012     Priority: Medium     Past Surgical History:   Procedure Laterality Date     COLONOSCOPY  01/03/2011    Next due-2021  hyperplastic polyp Dr Martinez     FRACTURE SURGERY Left 2009     Mandibular fracture with subsequent ORIF     OTHER SURGICAL HISTORY Right 01/06/2016     Social History     Tobacco Use     Smoking status: Current Every Day Smoker     Packs/day: 5.00     Types: Cigars     Smokeless tobacco: Never Used   Substance Use Topics     Alcohol use: Yes     Alcohol/week: 24.0 standard drinks     Comment: Alcoholic Drinks/day: case of beer per week     Current Outpatient Medications   Medication Sig Dispense Refill     ketoconazole (NIZORAL) 2 % external cream Apply topically daily 60 g 4     metoprolol succinate ER (TOPROL-XL) 100 MG 24 hr tablet Take 1 tablet (100 mg) by mouth daily 90 tablet 3     sildenafil (REVATIO) 20 MG tablet Take 20-60mg prior to sex (start with lower dose).  Do not take with nitrates.  Go to ED if erection lasts greater than 4 hours. 30 tablet 3     simvastatin (ZOCOR) 20 MG tablet Take 1 tablet (20 mg) by mouth At Bedtime 90 tablet 3     tamsulosin (FLOMAX) 0.4 MG capsule Take 1 capsule (0.4 mg) by mouth daily 90 capsule 3     tamsulosin (FLOMAX) 0.4 MG  "capsule Take 2 capsules (0.8 mg) by mouth daily 180 capsule 3     terbinafine (LAMISIL) 1 % external cream Apply topically 2 times daily 30 g 1     No Known Allergies    Review of Systems   Constitutional: Negative for fatigue and fever.   Skin: Negative for wound.   Hematological: Does not bruise/bleed easily.        OBJECTIVE:     /80 (BP Location: Right arm, Patient Position: Sitting, Cuff Size: Adult Large)   Pulse 83   Temp 98.6  F (37  C)   Resp 18   Ht 1.778 m (5' 10\")   Wt 107.4 kg (236 lb 12.8 oz)   SpO2 96%   BMI 33.98 kg/m    Body mass index is 33.98 kg/m .  Physical Exam  Constitutional:       Appearance: Normal appearance.   Skin:     Comments: Just to right of T 6 there is a sub cutaneous nodule, about 2 cm in diameter. No redness and no tenderness.  Small fistula tract.  Area prepped and infiltrated with 1% lidocaine and incised with #11 blade, x type incision.  Moderate amount of white thick material expressed.     Neurological:      General: No focal deficit present.      Mental Status: He is alert and oriented to person, place, and time.   Psychiatric:         Mood and Affect: Mood normal.         Behavior: Behavior normal.         Diagnostic Test Results:  none     ASSESSMENT/PLAN:         (L72.3) Sebaceous cyst  (primary encounter diagnosis)  Comment: This is moderate in size and I did not get out the lining.  If this returns, would next have him see a surgeon for definitive removal.  It is not acutely infected.    Plan: Puncture (I&D)Drainage of Lesion/Cyst  [69293]        Warm packs and follow up as needed.  Wound was left open.          Tal Crockett MD  [facility]    "

## 2020-02-28 ASSESSMENT — ENCOUNTER SYMPTOMS
FEVER: 0
WOUND: 0
BRUISES/BLEEDS EASILY: 0
FATIGUE: 0

## 2020-03-24 DIAGNOSIS — N52.9 ERECTILE DYSFUNCTION, UNSPECIFIED ERECTILE DYSFUNCTION TYPE: ICD-10-CM

## 2020-03-24 RX ORDER — SILDENAFIL CITRATE 20 MG/1
TABLET ORAL
Qty: 30 TABLET | Refills: 3 | Status: SHIPPED | OUTPATIENT
Start: 2020-03-24 | End: 2021-07-27

## 2020-06-02 NOTE — TELEPHONE ENCOUNTER
Procedure was cancelled.  Prep not needed.  Lilian Dupree LPN........................6/2/2020  9:45 AM

## 2020-10-28 ENCOUNTER — OFFICE VISIT (OUTPATIENT)
Dept: UROLOGY | Facility: OTHER | Age: 60
End: 2020-10-28
Attending: UROLOGY
Payer: COMMERCIAL

## 2020-10-28 VITALS
RESPIRATION RATE: 24 BRPM | DIASTOLIC BLOOD PRESSURE: 96 MMHG | WEIGHT: 227.8 LBS | SYSTOLIC BLOOD PRESSURE: 136 MMHG | HEART RATE: 104 BPM | BODY MASS INDEX: 32.69 KG/M2

## 2020-10-28 DIAGNOSIS — R35.0 BENIGN PROSTATIC HYPERPLASIA WITH URINARY FREQUENCY: Primary | ICD-10-CM

## 2020-10-28 DIAGNOSIS — N40.1 BENIGN PROSTATIC HYPERPLASIA WITH URINARY FREQUENCY: Primary | ICD-10-CM

## 2020-10-28 PROCEDURE — 99214 OFFICE O/P EST MOD 30 MIN: CPT | Performed by: UROLOGY

## 2020-10-28 PROCEDURE — G0463 HOSPITAL OUTPT CLINIC VISIT: HCPCS

## 2020-10-28 ASSESSMENT — PAIN SCALES - GENERAL: PAINLEVEL: NO PAIN (0)

## 2020-10-28 NOTE — PROGRESS NOTES
Type of Visit  EST    Chief Complaint  Nocturia    HPI  Mr. Price is a 59 year old male who follows up with obstructive urinary symptoms.  The patient has been on Flomax for over 2 years.  He recently switched to double dose Flomax.  He reports very weak stream, sense of incomplete emptying, double voiding.  He denies dysuria and gross hematuria.  His clinical bother is moderate and approaching severe.  He presents today asking about additional treatment options.  We have discussed 5 alpha reductase centimeters in the past however he is not interested given the risk for ED.      Family History  Family History   Problem Relation Age of Onset     Other - See Comments Mother 76        Stroke     Other - See Comments Father         Macular degeneration.     Heart Disease Father         Heart Disease,Possible CAD       Review of Systems  I personally reviewed the ROS with the patient.    Nursing Notes:   Elyse Bacon RN  10/28/2020 10:43 AM  Signed  Review of Systems:    Weight loss:    No     Recent fever/chills:  No   Night sweats:   No  Current skin rash:  No   Recent hair loss:  No  Heat intolerance:  No   Cold intolerance:  No  Chest pain:   No   Palpitations:   No  Shortness of breath:  No   Wheezing:   No  Constipation:    No   Diarrhea:   No   Nausea:   No   Vomiting:   No   Kidney/side pain:  No   Back pain:   No  Frequent headaches:  No   Dizziness:     No  Leg swelling:   No   Calf pain:    No      Physical Exam  Vitals:    10/28/20 1038 10/28/20 1043   BP: (!) 144/98 (!) 136/96   BP Location: Left arm Left arm   Patient Position: Sitting Sitting   Cuff Size: Adult Large Adult Large   Pulse: 104    Resp: 24    Weight: 103.3 kg (227 lb 12.8 oz)      Constitutional: No acute distress.  Alert and cooperative   Head: NCAT  Eyes: Conjunctivae normal  Cardiovascular: Regular rate.  Pulmonary/Chest: Respirations are even and non-labored bilaterally, no audible wheezing  Abdominal: Soft. No distension,  tenderness, masses or guarding.   Neurological: A + O x 3.  Cranial Nerves II-XII grossly intact.  Extremities: BRIAN x 4, Warm. No clubbing.  No cyanosis.    Skin: Pink, warm and dry.  No visible rashes noted.  Psychiatric:  Normal mood and affect  Back:  No left CVA tenderness.  No right CVA tenderness.  Genitourinary:  Nonpalpable bladder    Labs  Results for ADRIAN LARKIN (MRN [id number]) as of 2/3/2020 15:09   1/22/2020 12:37   Prostate Specific Antigen 0.971     Results for ADRIAN LARKIN (MRN [id number]) as of 2/3/2020 15:09   1/22/2020 12:37   Creatinine 0.94     Post-Void Residual  A post-void residual was measured by ultrasonic bladder scanner.  118 mL (previously recorded)    Assessment  Mr. Larkin is a 59 year old male who follows up with progressive obstructive urinary symptoms in spite of double dose Flomax.  I recommended cystoscopy for diagnostic evaluation of his prostate.    We briefly discussed treatment options of UroLift versus TURP depending on prostate size.  I explained that cystoscopy will help determine the more appropriate approach to treatment.    Plan  Continue Flomax 0.4mg BID  Follow-up for cystoscopy.          I spent 25 minutes on this patient's visit and over 50% of this time was spent in face-to-face counseling regarding his diagnosis, treatment options with emphasis on  risks and benefits of each, prognosis and importance of compliance.

## 2020-11-04 ENCOUNTER — OFFICE VISIT (OUTPATIENT)
Dept: UROLOGY | Facility: OTHER | Age: 60
End: 2020-11-04
Attending: UROLOGY
Payer: COMMERCIAL

## 2020-11-04 VITALS — WEIGHT: 226 LBS | BODY MASS INDEX: 32.43 KG/M2 | HEART RATE: 79 BPM

## 2020-11-04 DIAGNOSIS — N40.1 BENIGN PROSTATIC HYPERPLASIA WITH URINARY FREQUENCY: Primary | ICD-10-CM

## 2020-11-04 DIAGNOSIS — R35.0 BENIGN PROSTATIC HYPERPLASIA WITH URINARY FREQUENCY: Primary | ICD-10-CM

## 2020-11-04 LAB
ALBUMIN UR-MCNC: NEGATIVE MG/DL
APPEARANCE UR: CLEAR
BILIRUB UR QL STRIP: NEGATIVE
COLOR UR AUTO: NORMAL
GLUCOSE UR STRIP-MCNC: NEGATIVE MG/DL
HGB UR QL STRIP: NEGATIVE
KETONES UR STRIP-MCNC: NEGATIVE MG/DL
LEUKOCYTE ESTERASE UR QL STRIP: NEGATIVE
NITRATE UR QL: NEGATIVE
PH UR STRIP: 6.5 PH (ref 5–7)
SOURCE: NORMAL
SP GR UR STRIP: 1.02 (ref 1–1.03)
UROBILINOGEN UR STRIP-MCNC: NORMAL MG/DL (ref 0–2)

## 2020-11-04 PROCEDURE — 81003 URINALYSIS AUTO W/O SCOPE: CPT | Mod: ZL | Performed by: UROLOGY

## 2020-11-04 PROCEDURE — 51798 US URINE CAPACITY MEASURE: CPT | Performed by: UROLOGY

## 2020-11-04 PROCEDURE — 99214 OFFICE O/P EST MOD 30 MIN: CPT | Mod: 25 | Performed by: UROLOGY

## 2020-11-04 PROCEDURE — 52000 CYSTOURETHROSCOPY: CPT | Performed by: UROLOGY

## 2020-11-04 PROCEDURE — G0463 HOSPITAL OUTPT CLINIC VISIT: HCPCS | Mod: 25

## 2020-11-04 ASSESSMENT — PAIN SCALES - GENERAL: PAINLEVEL: NO PAIN (0)

## 2020-11-04 NOTE — NURSING NOTE
Post-Void Residual  A post-void residual was measured by ultrasonic bladder scanner.  150 mL    Patient positioned in supine position, perineum area prepped with chlorhexidene Gluconate and patient draped per sterile technique. Per verbal order read back by Dereck Merchant MD, Urojet 10mL 2% lidocaine jelly to be instilled into urethra.  Urojet- 10ml 2% Lidocaine jelly instilled into the urethra.    Urojet 2%  Lot#: [id number]  Expiration date: 06/22  : [employer]  NDC: [address]-5    [place] Protocol    A. Pre-procedure verification complete Yes  1-relevant information / documentation available, reviewed and properly matched to the patient; 2-consent accurate and complete, 3-equipment and supplies available    B. Site marking complete N/A  Site marked if not in continuous attendance with patient    C. TIME OUT completed Yes  Time Out was conducted just prior to starting procedure to verify the eight required elements: 1-patient identity, 2-consent accurate and complete, 3-position, 4-correct side/site marked (if applicable), 5-procedure, 6-relevant images / results properly labeled and displayed (if applicable), 7-antibiotics / irrigation fluids (if applicable), 8-safety precautions.    After procedure perineum area rinsed. Discharge instructions reviewed with patient. Patient verbalized understanding of discharge instructions and discharged ambulatory.  Shahla Rojas LPN..................11/4/2020  1:28 PM

## 2020-11-04 NOTE — PATIENT INSTRUCTIONS

## 2020-11-04 NOTE — PROGRESS NOTES
Type of Visit  EST    Chief Complaint  BPH with weak stream    HPI  Mr. Price is a 59 year old male who follows up with obstructive urinary symptoms.  The patient has been on Flomax for over 2 years.  He recently switched to double dose Flomax.  He reports very weak stream, sense of incomplete emptying, double voiding.  He denies dysuria and gross hematuria.  After transitioning to double dose Flomax he continues to report minimal benefit.  His clinical bother is described as moderate and he is motivated for additional treatment.  Because of this I recommended cystoscopy as a diagnostic procedure prior to a possible outlet procedure.  He reports no changes in his symptoms since the last visit.    We have discussed 5 alpha reductase centimeters in the past however he is not interested given the risk for ED.      Family History  Family History   Problem Relation Age of Onset     Other - See Comments Mother 76        Stroke     Other - See Comments Father         Macular degeneration.     Heart Disease Father         Heart Disease,Possible CAD       Review of Systems  I personally reviewed the ROS with the patient.    Nursing Notes:   Shahla Rojas LPN  11/4/2020  1:29 PM  Addendum  Post-Void Residual  A post-void residual was measured by ultrasonic bladder scanner.  150 mL    Patient positioned in supine position, perineum area prepped with chlorhexidene Gluconate and patient draped per sterile technique. Per verbal order read back by Dereck Merchant MD, Urojet 10mL 2% lidocaine jelly to be instilled into urethra.  Urojet- 10ml 2% Lidocaine jelly instilled into the urethra.    Urojet 2%  Lot#: [id number]  Expiration date: 06/22  : [employer]  NDC: [address]-8    [place] Protocol    A. Pre-procedure verification complete Yes  1-relevant information / documentation available, reviewed and properly matched to the patient; 2-consent accurate and complete, 3-equipment and supplies available    B. Site  marking complete N/A  Site marked if not in continuous attendance with patient    C. TIME OUT completed Yes  Time Out was conducted just prior to starting procedure to verify the eight required elements: 1-patient identity, 2-consent accurate and complete, 3-position, 4-correct side/site marked (if applicable), 5-procedure, 6-relevant images / results properly labeled and displayed (if applicable), 7-antibiotics / irrigation fluids (if applicable), 8-safety precautions.    After procedure perineum area rinsed. Discharge instructions reviewed with patient. Patient verbalized understanding of discharge instructions and discharged ambulatory.  Shahla Rojas LPN..................11/4/2020  1:28 PM    Unintentional weight loss:  No  Recent fever/chills: No  Night sweats: No   Current skin rash: No   Recent hair loss: No   Heat intolerance: No   Cold intolerance: No   Chest pain: No   Palpitations: No   Shortness of breath: No  Wheezing: No   Constipation: No   Diarrhea: No   Nausea: No    Vomiting: No   Kidney/side pain: No    Back pain: No  Frequent headaches: No  Dizziness: No   Leg swelling: No   Calf pain: No    Physical Exam  Vitals:    11/04/20 1303   Pulse: 79   Weight: 102.5 kg (226 lb)     Constitutional: No acute distress.  Alert and cooperative   Head: NCAT  Eyes: Conjunctivae normal  Cardiovascular: Regular rate.  Pulmonary/Chest: Respirations are even and non-labored bilaterally, no audible wheezing  Abdominal: Soft. No distension, tenderness, masses or guarding.   Neurological: A + O x 3.  Cranial Nerves II-XII grossly intact.  Extremities: BRIAN x 4, Warm. No clubbing.  No cyanosis.    Skin: Pink, warm and dry.  No visible rashes noted.  Psychiatric:  Normal mood and affect  Back:  No left CVA tenderness.  No right CVA tenderness.  Genitourinary:  Nonpalpable bladder    ^^^^^^^^^^^^^^^^^^^^^^^^^^^^^^^^^^^^^^^^^^^^^^^^^^^^^^^^^^^^^^^    Preprocedure diagnosis  Weak stream    Postprocedure diagnosis  BPH  with weak stream and obstruction    Procedure  Flexible Cystourethroscopy    Surgeon  Dereck Merchant MD    Anesthesia  2% lidocaine jelly intraurethrally    Complications  None    Indications  59 year old male undergoing a flexible cystoscopy for the above mentioned indications.    Findings  Cystoscopic findings included a normal anterior urethra.    The prostate was relatively short length urethra and obstructing bilateral prostate lobes.  The bladder appeared to be normal capacity.    There were no tumors, stones or foreign bodies.    The orifices were slit-shaped and in their normal location.    Procedure  The patient was placed in supine position and prepped and draped in sterile fashion with lidocaine jelly per urethra for anesthesia.    I passed a lubricated 14F flexible cystoscope through the penile urethra and into the bladder and the bladder was completely visualized.  The cystoscope was retroflexed and the bladder neck and prostate visualized.    The cystoscope was slowly withdrawn while visualizing the urethra and the procedure terminated.    The patient tolerated the procedure well.      ^^^^^^^^^^^^^^^^^^^^^^^^^^^^^^^^^^^^^^^^^^^^^^^^^^^^^^^^^^^^^^^    Labs  Results for orders placed or performed in visit on 11/04/20   UA reflex to Microscopic     Status: None   Result Value Ref Range    Color Urine Light Yellow     Appearance Urine Clear     Glucose Urine Negative NEG^Negative mg/dL    Bilirubin Urine Negative NEG^Negative    Ketones Urine Negative NEG^Negative mg/dL    Specific Gravity Urine 1.024 1.003 - 1.035    Blood Urine Negative NEG^Negative    pH Urine 6.5 5.0 - 7.0 pH    Protein Albumin Urine Negative NEG^Negative mg/dL    Urobilinogen mg/dL Normal 0.0 - 2.0 mg/dL    Nitrite Urine Negative NEG^Negative    Leukocyte Esterase Urine Negative NEG^Negative    Source Midstream Urine        Results for ADRIAN LARKIN TOMAS (MRN [id number]) as of 2/3/2020 15:09   1/22/2020 12:37   Prostate Specific Antigen  0.971     Results for ADRIAN LARKIN (MRN [id number]) as of 2/3/2020 15:09   1/22/2020 12:37   Creatinine 0.94     Post-Void Residual  A post-void residual was measured by ultrasonic bladder scanner.  150 mL today  118 mL (previously recorded)    Assessment  Mr. Larkin is a 59 year old male who follows up with progressive obstructive urinary symptoms in spite of double dose Flomax who underwent cystoscopy today revealing a relatively short length urethra and obstructing bilateral prostate lobes.    We again discussed treatment options of UroLift versus TURP depending on prostate size.  The patient is a UroLift candidate and I would recommend against TURP given his particular anatomy.    Plan  Continue Flomax 0.4mg BID  The patient plans to consider his options and will follow-up to schedule UroLift in the future if he elects this treatment.            I spent 25 minutes on this patient's visit (exclusive of separately billed services/procedures) and over half of this time was spent in face-to-face counseling regarding his diagnosis, treatment options with emphasis on  risks and benefits of each, prognosis and importance of compliance.

## 2020-11-18 ENCOUNTER — ALLIED HEALTH/NURSE VISIT (OUTPATIENT)
Dept: FAMILY MEDICINE | Facility: OTHER | Age: 60
End: 2020-11-18
Attending: FAMILY MEDICINE
Payer: COMMERCIAL

## 2020-11-18 DIAGNOSIS — Z23 NEED FOR PROPHYLACTIC VACCINATION AND INOCULATION AGAINST INFLUENZA: Primary | ICD-10-CM

## 2020-11-18 PROCEDURE — G0008 ADMIN INFLUENZA VIRUS VAC: HCPCS

## 2021-01-22 NOTE — TELEPHONE ENCOUNTER
[facility] sent Rx request for the following:   sildenafil (pulmonary hypertension) 20 mg tablet   Sig: TAKE 1-3 TABLETS prior TO sex (start WITH lower DOSE). DO not TAKE WITH nitrates. Go TO Emergency Department IF erection lasts greater THAN 4 hours.       Last Prescription Date:   1/22/2020  Last Fill Qty/Refills:         30, R-3    Last Office Visit:              2/27/2020   Future Office visit:           None     Routing refill request to provider for review/approval because:    Erectile Dysfuction Protocol [id number]/24/2020 12:41 PM   Absence of Alpha Blockers on Med list     Will route to teamlet for consideration    Hallie Ulloa RN  ....................  3/24/2020   12:46 PM       77

## 2021-02-01 DIAGNOSIS — N40.1 BENIGN PROSTATIC HYPERPLASIA WITH LOWER URINARY TRACT SYMPTOMS, SYMPTOM DETAILS UNSPECIFIED: ICD-10-CM

## 2021-02-01 DIAGNOSIS — E78.00 HIGH BLOOD CHOLESTEROL: ICD-10-CM

## 2021-02-01 DIAGNOSIS — I10 ESSENTIAL HYPERTENSION: ICD-10-CM

## 2021-02-01 NOTE — TELEPHONE ENCOUNTER
[id number] sent Rx request for the following:   metoprolol succinate ER (TOPROL-XL) 100 MG 24 hr tablet  Sig:TAKE 1 TABLET (100 MG) BY MOUTH DAILY    Last Prescription Date:   01/22/2020  Last Fill Qty/Refills:         90, R-3    Beta-Blockers Protocol Failed - 2/1/2021  1:00 AM       Failed - Blood pressure under 140/90 in past 12 months    BP Readings from Last 3 Encounters:   10/28/20 (!) 136/96   02/27/20 102/80   02/03/20 126/80              simvastatin (ZOCOR) 20 MG tablet  Sig: TAKE 1 TABLET (20 MG) BY MOUTH AT BEDTIME    Last Prescription Date:   01/22/2020  Last Fill Qty/Refills:         90, R-3    Statins Protocol Failed - 2/1/2021  1:00 AM       Failed - LDL on file in past 12 months    Recent Labs   Lab Test 02/12/19  1423   LDL 79          tamsulosin (FLOMAX) 0.4 MG capsule   Sig:TAKE 2 CAPSULES (0.8 MG) BY MOUTH DAILY    Last Prescription Date:   01/22/2020  Last Fill Qty/Refills:         90, R-3    Alpha Blockers Failed - 2/1/2021  1:00 AM       Failed - Blood pressure under 140/90 in past 12 months    BP Readings from Last 3 Encounters:   10/28/20 (!) 136/96   02/27/20 102/80   02/03/20 126/80                Failed - Patient does not have Tadalafil, Vardenafil, or Sildenafil on their medication list     Last Office Visit:              02/27/2020 ([facility])   Future Office visit:           None noted    Last comprehensive visit 01/22/2020. Letter sent regarding appointment need. Will route for PCP review. Unable to complete prescription refill per RN Medication Refill Policy.................... Rubina Romero RN ....................  2/1/2021   12:38 PM

## 2021-02-01 NOTE — LETTER
February 1, 2021      Watson Price  [address] [address]        Dear Watson,       A refill of metoprolol succinate ER (TOPROL-XL) 100 MG 24 hr tablet and simvastatin (ZOCOR) 20 MG tablet and tamsulosin (FLOMAX) 0.4 MG capsule  have been requested by your pharmacy and we noticed that you are overdue for an annual exam.  Your last comprehensive visit with Tal Crockett MD was on 01/22/2020.    This refill request has been sent to your provider for consideration at this time.    Your health is very important to us.  Please call the clinic at [phone] to schedule your appointment.    Thank you for choosing [facility] and [facility] for your health care needs.    Sincerely,    Refill ROBY  [facility]

## 2021-02-02 RX ORDER — TAMSULOSIN HYDROCHLORIDE 0.4 MG/1
0.8 CAPSULE ORAL DAILY
Qty: 180 CAPSULE | Refills: 3 | Status: SHIPPED | OUTPATIENT
Start: 2021-02-02 | End: 2021-02-19

## 2021-02-02 RX ORDER — SIMVASTATIN 20 MG
20 TABLET ORAL AT BEDTIME
Qty: 90 TABLET | Refills: 3 | Status: SHIPPED | OUTPATIENT
Start: 2021-02-02 | End: 2021-02-19

## 2021-02-02 RX ORDER — METOPROLOL SUCCINATE 100 MG/1
100 TABLET, EXTENDED RELEASE ORAL DAILY
Qty: 90 TABLET | Refills: 3 | Status: SHIPPED | OUTPATIENT
Start: 2021-02-02 | End: 2021-02-19

## 2021-02-08 ENCOUNTER — TELEPHONE (OUTPATIENT)
Dept: FAMILY MEDICINE | Facility: OTHER | Age: 61
End: 2021-02-08

## 2021-02-09 NOTE — TELEPHONE ENCOUNTER
He actually needs a nurse only shot visit.  If I order it today, needs to be done today.  Set him up for the visit.  Tal Crockett MD on 2/9/2021 at 8:57 AM

## 2021-02-09 NOTE — TELEPHONE ENCOUNTER
Information given to injection nurse and will start process to get patient approved for Shingrix. Notified patient of this.  Renetta Garza LPN ....................  2/9/2021   9:52 AM

## 2021-02-19 ENCOUNTER — ALLIED HEALTH/NURSE VISIT (OUTPATIENT)
Dept: FAMILY MEDICINE | Facility: OTHER | Age: 61
End: 2021-02-19
Attending: FAMILY MEDICINE
Payer: COMMERCIAL

## 2021-02-19 VITALS
SYSTOLIC BLOOD PRESSURE: 138 MMHG | BODY MASS INDEX: 34.96 KG/M2 | RESPIRATION RATE: 16 BRPM | WEIGHT: 244.2 LBS | OXYGEN SATURATION: 98 % | DIASTOLIC BLOOD PRESSURE: 76 MMHG | HEIGHT: 70 IN | HEART RATE: 89 BPM | TEMPERATURE: 97.4 F

## 2021-02-19 DIAGNOSIS — Z23 NEED FOR VACCINATION: Primary | ICD-10-CM

## 2021-02-19 DIAGNOSIS — Z00.00 ROUTINE GENERAL MEDICAL EXAMINATION AT A HEALTH CARE FACILITY: Primary | ICD-10-CM

## 2021-02-19 DIAGNOSIS — Z23 NEED FOR PNEUMOCOCCAL VACCINATION: ICD-10-CM

## 2021-02-19 DIAGNOSIS — Z12.11 COLON CANCER SCREENING: ICD-10-CM

## 2021-02-19 DIAGNOSIS — E78.00 HIGH BLOOD CHOLESTEROL: ICD-10-CM

## 2021-02-19 DIAGNOSIS — Z87.891 PERSONAL HISTORY OF TOBACCO USE: ICD-10-CM

## 2021-02-19 DIAGNOSIS — N40.1 BENIGN PROSTATIC HYPERPLASIA WITH LOWER URINARY TRACT SYMPTOMS, SYMPTOM DETAILS UNSPECIFIED: ICD-10-CM

## 2021-02-19 DIAGNOSIS — I10 ESSENTIAL HYPERTENSION: ICD-10-CM

## 2021-02-19 LAB
ANION GAP SERPL CALCULATED.3IONS-SCNC: 7 MMOL/L (ref 3–14)
BUN SERPL-MCNC: 19 MG/DL (ref 7–25)
CALCIUM SERPL-MCNC: 10.1 MG/DL (ref 8.6–10.3)
CHLORIDE SERPL-SCNC: 101 MMOL/L (ref 98–107)
CHOLEST SERPL-MCNC: 206 MG/DL
CO2 SERPL-SCNC: 27 MMOL/L (ref 21–31)
CREAT SERPL-MCNC: 0.93 MG/DL (ref 0.7–1.3)
GFR SERPL CREATININE-BSD FRML MDRD: 83 ML/MIN/{1.73_M2}
GLUCOSE SERPL-MCNC: 108 MG/DL (ref 70–105)
HDLC SERPL-MCNC: 46 MG/DL (ref 23–92)
LDLC SERPL CALC-MCNC: 85 MG/DL
NONHDLC SERPL-MCNC: 160 MG/DL
POTASSIUM SERPL-SCNC: 4.2 MMOL/L (ref 3.5–5.1)
PSA SERPL-ACNC: 0.83 NG/ML
SODIUM SERPL-SCNC: 135 MMOL/L (ref 134–144)
TRIGL SERPL-MCNC: 374 MG/DL

## 2021-02-19 PROCEDURE — 90472 IMMUNIZATION ADMIN EACH ADD: CPT

## 2021-02-19 PROCEDURE — 90750 HZV VACC RECOMBINANT IM: CPT

## 2021-02-19 PROCEDURE — G0103 PSA SCREENING: HCPCS | Mod: ZL | Performed by: FAMILY MEDICINE

## 2021-02-19 PROCEDURE — 99396 PREV VISIT EST AGE 40-64: CPT | Mod: 25 | Performed by: FAMILY MEDICINE

## 2021-02-19 PROCEDURE — 90471 IMMUNIZATION ADMIN: CPT

## 2021-02-19 PROCEDURE — 84153 ASSAY OF PSA TOTAL: CPT | Mod: ZL | Performed by: FAMILY MEDICINE

## 2021-02-19 PROCEDURE — G0296 VISIT TO DETERM LDCT ELIG: HCPCS | Performed by: FAMILY MEDICINE

## 2021-02-19 PROCEDURE — 36415 COLL VENOUS BLD VENIPUNCTURE: CPT | Mod: ZL | Performed by: FAMILY MEDICINE

## 2021-02-19 PROCEDURE — 80048 BASIC METABOLIC PNL TOTAL CA: CPT | Mod: ZL | Performed by: FAMILY MEDICINE

## 2021-02-19 PROCEDURE — 90732 PPSV23 VACC 2 YRS+ SUBQ/IM: CPT

## 2021-02-19 PROCEDURE — 80061 LIPID PANEL: CPT | Mod: ZL | Performed by: FAMILY MEDICINE

## 2021-02-19 RX ORDER — TAMSULOSIN HYDROCHLORIDE 0.4 MG/1
0.8 CAPSULE ORAL DAILY
Qty: 180 CAPSULE | Refills: 3 | Status: SHIPPED | OUTPATIENT
Start: 2021-02-19 | End: 2022-03-01

## 2021-02-19 RX ORDER — METOPROLOL SUCCINATE 100 MG/1
100 TABLET, EXTENDED RELEASE ORAL DAILY
Qty: 90 TABLET | Refills: 3 | Status: SHIPPED | OUTPATIENT
Start: 2021-02-19 | End: 2021-02-19

## 2021-02-19 RX ORDER — SIMVASTATIN 20 MG
20 TABLET ORAL AT BEDTIME
Qty: 90 TABLET | Refills: 3 | Status: SHIPPED | OUTPATIENT
Start: 2021-02-19 | End: 2022-01-12

## 2021-02-19 RX ORDER — METOPROLOL SUCCINATE 200 MG/1
200 TABLET, EXTENDED RELEASE ORAL DAILY
Qty: 90 TABLET | Refills: 3 | Status: SHIPPED | OUTPATIENT
Start: 2021-02-19 | End: 2021-12-24

## 2021-02-19 ASSESSMENT — ANXIETY QUESTIONNAIRES
GAD7 TOTAL SCORE: 0
6. BECOMING EASILY ANNOYED OR IRRITABLE: NOT AT ALL
IF YOU CHECKED OFF ANY PROBLEMS ON THIS QUESTIONNAIRE, HOW DIFFICULT HAVE THESE PROBLEMS MADE IT FOR YOU TO DO YOUR WORK, TAKE CARE OF THINGS AT HOME, OR GET ALONG WITH OTHER PEOPLE: NOT DIFFICULT AT ALL
3. WORRYING TOO MUCH ABOUT DIFFERENT THINGS: NOT AT ALL
1. FEELING NERVOUS, ANXIOUS, OR ON EDGE: NOT AT ALL
7. FEELING AFRAID AS IF SOMETHING AWFUL MIGHT HAPPEN: NOT AT ALL
5. BEING SO RESTLESS THAT IT IS HARD TO SIT STILL: NOT AT ALL
2. NOT BEING ABLE TO STOP OR CONTROL WORRYING: NOT AT ALL

## 2021-02-19 ASSESSMENT — PAIN SCALES - GENERAL: PAINLEVEL: NO PAIN (0)

## 2021-02-19 ASSESSMENT — PATIENT HEALTH QUESTIONNAIRE - PHQ9: 5. POOR APPETITE OR OVEREATING: NOT AT ALL

## 2021-02-19 ASSESSMENT — MIFFLIN-ST. JEOR: SCORE: 1918.93

## 2021-02-19 NOTE — NURSING NOTE
"Coming in for a physical check up and medication refill    Chief Complaint   Patient presents with     Physical     check up       Initial /76   Pulse 89   Temp 97.4  F (36.3  C)   Resp 16   Ht 1.778 m (5' 10\")   Wt 110.8 kg (244 lb 3.2 oz)   SpO2 98%   BMI 35.04 kg/m   Estimated body mass index is 35.04 kg/m  as calculated from the following:    Height as of this encounter: 1.778 m (5' 10\").    Weight as of this encounter: 110.8 kg (244 lb 3.2 oz).  Medication Reconciliation: complete    Anna David LPN  "

## 2021-02-19 NOTE — LETTER
February 19, 2021      Watson Price  [address] [address]        Dear ,    We are writing to inform you of your test results.    Your test results fall within the expected range(s) or remain unchanged from previous results.  Please continue with current treatment plan.    Resulted Orders   Lipid Panel   Result Value Ref Range    Cholesterol 206 (H) <200 mg/dL    Triglycerides 374 (H) <150 mg/dL      Comment:      Borderline high:  150-199 mg/dl  High:             200-499 mg/dl  Very high:       >499 mg/dl      HDL Cholesterol 46 23 - 92 mg/dL    LDL Cholesterol Calculated 85 <100 mg/dL      Comment:      Desirable:       <100 mg/dl    Non HDL Cholesterol 160 (H) <130 mg/dL      Comment:      Above Desirable:  130-159 mg/dl  Borderline high:  160-189 mg/dl  High:             190-219 mg/dl  Very high:       >219 mg/dl     Basic Metabolic Panel   Result Value Ref Range    Sodium 135 134 - 144 mmol/L    Potassium 4.2 3.5 - 5.1 mmol/L    Chloride 101 98 - 107 mmol/L    Carbon Dioxide 27 21 - 31 mmol/L    Anion Gap 7 3 - 14 mmol/L    Glucose 108 (H) 70 - 105 mg/dL    Urea Nitrogen 19 7 - 25 mg/dL    Creatinine 0.93 0.70 - 1.30 mg/dL    GFR Estimate 83 >60 mL/min/[1.73_m2]    GFR Estimate If Black >90 >60 mL/min/[1.73_m2]    Calcium 10.1 8.6 - 10.3 mg/dL   PSA Screen GH   Result Value Ref Range    PSA Screen 0.835 <3.100 ng/mL      Comment:      The DXI Access PSAS WHO assay is a two site immunoenzymatic assay. Assay   values obtained with different assay methods cannot be used interchangeably   due to differences in assay methods and reagent specificity.         If you have any questions or concerns, please call the clinic at the number listed above.       Sincerely,      Tal Crockett MD

## 2021-02-19 NOTE — PROGRESS NOTES
Verified patient's first and last name, and . Patient stated reason for visit today is to receive 1st Shingrix in series of 2. Patient denied any concerns with previous injections. Shingrix prepared and administered IM into left deltoid as ordered. Administration of medication documented in MAR (see MAR for further information regarding dose, lot #, NDC #, expiration date). Patient encouraged to wait in lobby for 15 minutes post-injection and notify staff immediately of any reaction.       Lilian Garcia RN  ....................  2021   11:31 AM

## 2021-02-19 NOTE — PROGRESS NOTES
3  SUBJECTIVE:   CC: Watson Price is an 60 year old male who presents for preventive health visit.        Patient has been advised of split billing requirements and indicates understanding: Yes  Healthy Habits:    Do you get at least three servings of calcium containing foods daily (dairy, green leafy vegetables, etc.)? yes    Amount of exercise or daily activities, outside of work: 7 day(s) per week    Problems taking medications regularly No    Medication side effects: No    Have you had an eye exam in the past two years? yes    Do you see a dentist twice per year? No teeth    Do you have sleep apnea, excessive snoring or daytime drowsiness?yes, he declines a work up.  Wife feels he would not wear cPAP.      Wants his meds refilled     Today's PHQ-2 Score:   PHQ-2 ( 1999 [employer]) 2/19/2021 1/22/2020   Q1: Little interest or pleasure in doing things 0 0   Q2: Feeling down, depressed or hopeless 0 0   PHQ-2 Score 0 0       Abuse: Current or Past(Physical, Sexual or Emotional)- No  Do you feel safe in your environment? Yes        Social History     Tobacco Use     Smoking status: Current Every Day Smoker     Types: Cigars     Smokeless tobacco: Never Used     Tobacco comment: 5 cigars a day   Substance Use Topics     Alcohol use: Yes     Alcohol/week: 24.0 standard drinks     Comment: Alcoholic Drinks/day: case of beer per week     If you drink alcohol do you typically have >3 drinks per day or >7 drinks per week? Yes - AUDIT SCORE:     No flowsheet data found.                      Last PSA: No results found for: PSA    Reviewed orders with patient. Reviewed health maintenance and updated orders accordingly - Yes  Lab work is in process  Labs reviewed in [facility]  Current Outpatient Medications   Medication Sig Dispense Refill     ketoconazole (NIZORAL) 2 % external cream Apply topically daily 60 g 4     metoprolol succinate ER (TOPROL-XL) 200 MG 24 hr tablet Take 1 tablet (200 mg) by mouth daily increased form 100  milligram daily 90 tablet 3     sildenafil (REVATIO) 20 MG tablet TAKE 1-3 TABLETS prior TO sex (start WITH lower DOSE). DO not TAKE WITH nitrates. Go TO Emergency Department IF erection lasts greater THAN 4 hours. 30 tablet 3     simvastatin (ZOCOR) 20 MG tablet Take 1 tablet (20 mg) by mouth At Bedtime 90 tablet 3     tamsulosin (FLOMAX) 0.4 MG capsule Take 2 capsules (0.8 mg) by mouth daily 180 capsule 3     terbinafine (LAMISIL) 1 % external cream Apply topically 2 times daily 30 g 1     No Known Allergies    Reviewed and updated as needed this visit by clinical staff  Tobacco  Allergies  Meds   Med Hx    Soc Hx        Reviewed and updated as needed this visit by Provider                Past Medical History:   Diagnosis Date     Diverticulosis of large intestine without perforation or abscess without bleeding     1/3/2011, inactive icd-9 diagnosis auto replaced with icd-10, display name retained//mporz     Essential (primary) hypertension     No Comments Provided     Impingement syndrome of right shoulder     5/21/2015     Migraine without status migrainosus, not intractable     No Comments Provided     Nodular prostate with lower urinary tract symptoms     No Comments Provided     Other shoulder lesions, right shoulder     5/21/2015     Other specified postprocedural states     1/6/2016     Right rotator cuff tear     6/12/2015      Past Surgical History:   Procedure Laterality Date     COLONOSCOPY  01/03/2011    Next due-2021  hyperplastic polyp Dr Martinez     FRACTURE SURGERY Left 2009     Mandibular fracture with subsequent ORIF     OTHER SURGICAL HISTORY Right 01/06/2016       ROS:  CONSTITUTIONAL: NEGATIVE for fever, chills, change in weight  INTEGUMENTARY/SKIN: NEGATIVE for worrisome rashes, moles or lesions  EYES: NEGATIVE for vision changes or irritation  ENT: NEGATIVE for ear, mouth and throat problems  RESP: NEGATIVE for significant cough or SOB  CV: NEGATIVE for chest pain, palpitations or  "peripheral edema  GI: NEGATIVE for nausea, abdominal pain, heartburn, or change in bowel habits   male: negative for dysuria, hematuria, decreased urinary stream, erectile dysfunction, urethral discharge  MUSCULOSKELETAL: NEGATIVE for significant arthralgias or myalgia  NEURO: NEGATIVE for weakness, dizziness or paresthesias  PSYCHIATRIC: NEGATIVE for changes in mood or affect    OBJECTIVE:   /76   Pulse 89   Temp 97.4  F (36.3  C)   Resp 16   Ht 1.778 m (5' 10\")   Wt 110.8 kg (244 lb 3.2 oz)   SpO2 98%   BMI 35.04 kg/m    EXAM:  GENERAL: healthy, alert and no distress  EYES: Eyes grossly normal to inspection, PERRL and conjunctivae and sclerae normal  HENT: ear canals and TM's normal, nose and mouth without ulcers or lesions  NECK: no adenopathy, no asymmetry, masses, or scars and thyroid normal to palpation  RESP: lungs clear to auscultation - no rales, rhonchi or wheezes  CV: regular rate and rhythm, normal S1 S2, no S3 or S4, no murmur, click or rub, no peripheral edema and peripheral pulses strong  ABDOMEN: soft, nontender, no hepatosplenomegaly, no masses and bowel sounds normal  MS: no gross musculoskeletal defects noted, no edema  SKIN: no suspicious lesions or rashes  NEURO: Normal strength and tone, mentation intact and speech normal  PSYCH: mentation appears normal, affect normal/bright    Diagnostic Test Results:  Labs reviewed in Epic    Results for orders placed or performed in visit on 02/19/21   Lipid Panel     Status: Abnormal   Result Value Ref Range    Cholesterol 206 (H) <200 mg/dL    Triglycerides 374 (H) <150 mg/dL    HDL Cholesterol 46 23 - 92 mg/dL    LDL Cholesterol Calculated 85 <100 mg/dL    Non HDL Cholesterol 160 (H) <130 mg/dL   Basic Metabolic Panel     Status: Abnormal   Result Value Ref Range    Sodium 135 134 - 144 mmol/L    Potassium 4.2 3.5 - 5.1 mmol/L    Chloride 101 98 - 107 mmol/L    Carbon Dioxide 27 21 - 31 mmol/L    Anion Gap 7 3 - 14 mmol/L    Glucose 108 " "(H) 70 - 105 mg/dL    Urea Nitrogen 19 7 - 25 mg/dL    Creatinine 0.93 0.70 - 1.30 mg/dL    GFR Estimate 83 >60 mL/min/[1.73_m2]    GFR Estimate If Black >90 >60 mL/min/[1.73_m2]    Calcium 10.1 8.6 - 10.3 mg/dL   PSA Screen GH     Status: None   Result Value Ref Range    PSA Screen 0.835 <3.100 ng/mL         ASSESSMENT/PLAN:       ICD-10-CM    1. Routine general medical examination at a health care facility  Z00.00    2. Essential hypertension  I10 Basic Metabolic Panel     metoprolol succinate ER (TOPROL-XL) 200 MG 24 hr tablet     DISCONTINUED: metoprolol succinate ER (TOPROL-XL) 100 MG 24 hr tablet   3. High blood cholesterol  E78.00 simvastatin (ZOCOR) 20 MG tablet     Lipid Panel   4. Benign prostatic hyperplasia with lower urinary tract symptoms, symptom details unspecified  N40.1 tamsulosin (FLOMAX) 0.4 MG capsule     PSA Screen GH   5. Personal history of tobacco use  Z87.891 Prof fee: Shared Decisionmaking for Lung Cancer Screening     CT Chest Lung Cancer Scrn Low Dose wo   6. Colon cancer screening  Z12.11 GASTROENTEROLOGY ADULT REF PROCEDURE ONLY   7. Need for pneumococcal vaccination  Z23 GH IMM-  PNEUMOCOCCAL VACCINE,ADULT,SQ OR IM     Increased toprol from 100 to 200 milligram daily.    Patient has been advised of split billing requirements and indicates understanding: Yes  COUNSELING:  Reviewed preventive health counseling, as reflected in patient instructions       Regular exercise       Healthy diet/nutrition       Colon cancer screening       Prostate cancer screening       Consider lung cancer screening for ages 55-80 years and 30 pack-year smoking history       Estimated body mass index is 35.04 kg/m  as calculated from the following:    Height as of this encounter: 1.778 m (5' 10\").    Weight as of this encounter: 110.8 kg (244 lb 3.2 oz).    Weight management plan: Discussed healthy diet and exercise guidelines    He reports that he has been smoking cigars. He has never used smokeless " tobacco.  Tobacco Cessation Action Plan:   Information offered: Patient not interested at this time      Counseling Resources:  ATP IV Guidelines  Pooled Cohorts Equation Calculator  FRAX Risk Assessment  ICSI Preventive Guidelines  Dietary Guidelines for Americans, 2010  [employer]'s MyPlate  ASA Prophylaxis  Lung CA Screening    Tal Crockett MD  [facility]    Smoked over 1 ppd for 30 years, stopped, and now 5 cigars daily (equivalent of 1 ppd of cigarettes)     Lung Cancer Screening Shared Decision Making Visit     Watson Price is eligible for lung cancer screening on the basis of the information provided in my signed lung cancer screening order.     I have discussed with patient the risks and benefits of screening for lung cancer with low-dose CT.     The risks include:  radiation exposure: one low dose chest CT has as much ionizing radiation as about 15 chest x-rays or 6 months of background radiation living in [place]    false positives: 96% of positive findings/nodules are NOT cancer, but some might still require additional diagnostic evaluation, including biopsy  over-diagnosis: some slow growing cancers that might never have been clinically significant will be detected and treated unnecessarily     The benefit of early detection of lung cancer is contingent upon adherence to annual screening or more frequent follow up if indicated.     Furthermore, reaping the benefits of screening requires Watson Price to be willing and physically able to undergo diagnostic procedures, if indicated. Although no specific guide is available for determining severity of comorbidities, it is reasonable to withhold screening in patients who have greater mortality risk from other diseases.     We did discuss that the only way to prevent lung cancer is to not smoke. Smoking cessation counseling was given, duration < 3 minutes.      I did not offer risk estimation using a calculator such as this  one:    ShouldIScreen

## 2021-02-19 NOTE — PATIENT INSTRUCTIONS
Preventive Health Recommendations  Male Ages 50 - 64    Yearly exam:             See your health care provider every year in order to  o   Review health changes.   o   Discuss preventive care.    o   Review your medicines if your doctor has prescribed any.     Have a cholesterol test every 5 years, or more frequently if you are at risk for high cholesterol/heart disease.     Have a diabetes test (fasting glucose) every three years. If you are at risk for diabetes, you should have this test more often.     Have a colonoscopy at age 50, or have a yearly FIT test (stool test). These exams will check for colon cancer.      Talk with your health care provider about whether or not a prostate cancer screening test (PSA) is right for you.    You should be tested each year for STDs (sexually transmitted diseases), if you re at risk.     Shots: Get a flu shot each year. Get a tetanus shot every 10 years.     Nutrition:    Eat at least 5 servings of fruits and vegetables daily.     Eat whole-grain bread, whole-wheat pasta and brown rice instead of white grains and rice.     Get adequate Calcium and Vitamin D.     Lifestyle    Exercise for at least 150 minutes a week (30 minutes a day, 5 days a week). This will help you control your weight and prevent disease.     Limit alcohol to one drink per day.     No smoking.     Wear sunscreen to prevent skin cancer.     See your dentist every six months for an exam and cleaning.     See your eye doctor every 1 to 2 years.    Lung Cancer Screening   Frequently Asked Questions  If you are at high-risk for lung cancer, getting screened with low-dose computed tomography (LDCT) every year can help save your life. This handout offers answers to some of the most common questions about lung cancer screening. If you have other questions, please call [phone] ([phone]).     What is it?  Lung cancer screening uses special X-ray technology to create an image of your lung tissue.  The exam is quick and easy and takes less than 10 seconds. We don t give you any medicine or use any needles. You can eat before and after the exam. You don t need to change your clothes as long as the clothing on your chest doesn t contain metal. But, you do need to be able to hold your breath for at least 6 seconds during the exam.    What is the goal of lung cancer screening?  The goal of lung cancer screening is to save lives. Many times, lung cancer is not found until a person starts having physical symptoms. Lung cancer screening can help detect lung cancer in the earliest stages when it may be easier to treat.    Who should be screened for lung cancer?  We suggest lung cancer screening for anyone who is at high-risk for lung cancer. You are in the high-risk group if you:      are between the ages of 55 and 79, and    have smoked at least 1 pack of cigarettes a day for 30 or more years, and    still smoke or have quit within the past 15 years.    However, if you have a new cough or shortness of breath, you should talk to your doctor before being screened.    Some national lung health advocacy groups also recommend screening for people ages 50 to 79 who have smoked an average of 1 pack of cigarettes a day for 20 years. They must also have at least 1 other risk factor for lung cancer, not including exposure to secondhand smoke. Other risk factors are having had cancer in the past, emphysema, pulmonary fibrosis, COPD, a family history of lung cancer, or exposure to certain materials such as arsenic, asbestos, beryllium, cadmium, chromium, diesel fumes, nickel, radon or silica. Your care team can help you know if you have one of these risk factors.     Why does it matter if I have symptoms?  Certain symptoms can be a sign that you have a condition in your lungs that should be checked and treated by your doctor. These symptoms include fever, chest pain, a new or changing cough, shortness of breath that you have  never felt before, coughing up blood or unexplained weight loss. Having any of these symptoms can greatly affect the results of lung cancer screening.       Should all smokers get an LDCT lung cancer screening exam?  It depends. Lung cancer screening is for a very specific group of men and women who have a history of heavy smoking over a long period of time (see  Who should be screened for lung cancer  above).  I am in the high-risk group, but have been diagnosed with cancer in the past. Is LDCT lung cancer screening right for me?  In some cases, you should not have LDCT lung screening, such as when your doctor is already following your cancer with CT scan studies. Your doctor will help you decide if LDCT lung screening is right for you.  Do I need to have a screening exam every year?  Yes. If you are in the high-risk group described earlier, you should get an LDCT lung cancer screening exam every year until you are 79, or are no longer willing or able to undergo screening and possible procedures to diagnose and treat lung cancer.  How effective is LDCT at preventing death from lung cancer?  Studies have shown that LDCT lung cancer screening can lower the risk of death from lung cancer by 20 percent in people who are at high-risk.  What are the risks?  There are some risks and limitations of LDCT lung cancer screening. We want to make sure you understand the risks and benefits, so please let us know if you have any questions. Your doctor may want to talk with you more about these risks.    Radiation exposure: As with any exam that uses radiation, there is a very small increased risk of cancer. The amount of radiation in LDCT is small--about the same amount a person would get from a mammogram. Your doctor orders the exam when he or she feels the potential benefits outweigh the risks.    False negatives: No test is perfect, including LDCT. It is possible that you may have a medical condition, including lung cancer,  that is not found during your exam. This is called a false negative result.    False positives and more testing: LDCT very often finds something in the lung that could be cancer, but in fact is not. This is called a false positive result. False positive tests often cause anxiety. To make sure these findings are not cancer, you may need to have more tests. These tests will be done only if you give us permission. Sometimes patients need a treatment that can have side effects, such as a biopsy. For more information on false positives, see  What can I expect from the results?     Findings not related to lung cancer: Your LDCT exam also takes pictures of areas of your body next to your lungs. In a very small number of cases, the CT scan will show an abnormal finding in one of these areas, such as your kidneys, adrenal glands, liver or thyroid. This finding may not be serious, but you may need more tests. Your doctor can help you decide what other tests you may need, if any.  What can I expect from the results?  About 1 out of 4 LDCT exams will find something that may need more tests. Most of the time, these findings are lung nodules. Lung nodules are very small collections of tissue in the lung. These nodules are very common, and the vast majority--more than 97 percent--are not cancer (benign). Most are normal lymph nodes or small areas of scarring from past infections.  But, if a small lung nodule is found to be cancer, the cancer can be cured more than 90 percent of the time. To know if the nodule is cancer, we may need to get more images before your next yearly screening exam. If the nodule has suspicious features (for example, it is large, has an odd shape or grows over time), we will refer you to a specialist for further testing.  Will my doctor also get the results?  Yes. Your doctor will get a copy of your results.  Is it okay to keep smoking now that there s a cancer screening exam?  No. Tobacco is one of the  strongest cancer-causing agents. It causes not only lung cancer, but other cancers and cardiovascular (heart) diseases as well. The damage caused by smoking builds over time. This means that the longer you smoke, the higher your risk of disease. While it is never too late to quit, the sooner you quit, the better.  Where can I find help to quit smoking?  The best way to prevent lung cancer is to stop smoking. If you have already quit smoking, congratulations and keep it up! For help on quitting smoking, please call [place] at 3-383-854-EHNJ ([phone]) or the American Cancer Society at 1-865.552.1729 to find local resources near you.  One-on-one health coaching:  If you d prefer to work individually with a health care provider on tobacco cessation, we offer:      Medication Therapy Management:  Our specially trained pharmacists work closely with you and your doctor to help you quit smoking.  Call [phone] or [phone] (toll free).     Can Do: Health coaching offered by [facility].  [email or web address]

## 2021-02-20 ASSESSMENT — ANXIETY QUESTIONNAIRES: GAD7 TOTAL SCORE: 0

## 2021-03-08 DIAGNOSIS — Z12.11 SPECIAL SCREENING FOR MALIGNANT NEOPLASMS, COLON: ICD-10-CM

## 2021-03-08 DIAGNOSIS — Z01.818 PRE-OP TESTING: Primary | ICD-10-CM

## 2021-03-08 NOTE — TELEPHONE ENCOUNTER
Screening Questions for the Scheduling of Screening Colonoscopies   (If Colonoscopy is diagnostic, Provider should review the chart before scheduling.)  Are you younger than 50 or older than 80?  NO  Do you take aspirin or fish oil?  YES (if yes, tell patient to stop 1 week prior to Colonoscopy)  Do you take warfarin (Coumadin), clopidogrel (Plavix), apixaban (Eliquis), dabigatram (Pradaxa), rivaroxaban (Xarelto) or any blood thinner? NO  Do you use oxygen at home?  NO  Do you have kidney disease? NO  Are you on dialysis? NO  Have you had a stroke or heart attack in the last year? NO  Have you had a stent in your heart or any blood vessel in the last year? NO  Have you had a transplant of any organ? NO  Have you had a colonoscopy or upper endoscopy (EGD) before? YES         When?  10 YEARS AGO (PER SPOUSE) 2011  Date of scheduled Colonoscopy. 04/21/2021   Provider DR. WEISS  [facility]

## 2021-03-09 RX ORDER — POLYETHYLENE GLYCOL 3350 17 G/17G
POWDER, FOR SOLUTION ORAL
Qty: 510 G | Refills: 0 | Status: ON HOLD | OUTPATIENT
Start: 2021-03-09 | End: 2021-04-21

## 2021-03-09 RX ORDER — BISACODYL 5 MG/1
TABLET, DELAYED RELEASE ORAL
Qty: 2 TABLET | Refills: 0 | Status: ON HOLD | OUTPATIENT
Start: 2021-03-09 | End: 2021-04-21

## 2021-04-17 ENCOUNTER — ALLIED HEALTH/NURSE VISIT (OUTPATIENT)
Dept: FAMILY MEDICINE | Facility: OTHER | Age: 61
End: 2021-04-17
Attending: SURGERY
Payer: COMMERCIAL

## 2021-04-17 DIAGNOSIS — Z01.818 PRE-OP TESTING: ICD-10-CM

## 2021-04-17 LAB
SARS-COV-2 RNA RESP QL NAA+PROBE: NORMAL
SPECIMEN SOURCE: NORMAL

## 2021-04-17 PROCEDURE — U0005 INFEC AGEN DETEC AMPLI PROBE: HCPCS | Mod: ZL | Performed by: SURGERY

## 2021-04-17 PROCEDURE — U0003 INFECTIOUS AGENT DETECTION BY NUCLEIC ACID (DNA OR RNA); SEVERE ACUTE RESPIRATORY SYNDROME CORONAVIRUS 2 (SARS-COV-2) (CORONAVIRUS DISEASE [COVID-19]), AMPLIFIED PROBE TECHNIQUE, MAKING USE OF HIGH THROUGHPUT TECHNOLOGIES AS DESCRIBED BY CMS-2020-01-R: HCPCS | Mod: ZL | Performed by: SURGERY

## 2021-04-17 PROCEDURE — C9803 HOPD COVID-19 SPEC COLLECT: HCPCS

## 2021-04-18 LAB
LABORATORY COMMENT REPORT: NORMAL
SARS-COV-2 RNA RESP QL NAA+PROBE: NEGATIVE
SPECIMEN SOURCE: NORMAL

## 2021-04-21 ENCOUNTER — ANESTHESIA EVENT (OUTPATIENT)
Dept: SURGERY | Facility: OTHER | Age: 61
End: 2021-04-21
Payer: COMMERCIAL

## 2021-04-21 ENCOUNTER — HOSPITAL ENCOUNTER (OUTPATIENT)
Facility: OTHER | Age: 61
Discharge: HOME | End: 2021-04-21
Attending: SURGERY | Admitting: SURGERY
Payer: COMMERCIAL

## 2021-04-21 ENCOUNTER — ANESTHESIA (OUTPATIENT)
Dept: SURGERY | Facility: OTHER | Age: 61
End: 2021-04-21
Payer: COMMERCIAL

## 2021-04-21 VITALS
OXYGEN SATURATION: 95 % | RESPIRATION RATE: 12 BRPM | HEIGHT: 70 IN | SYSTOLIC BLOOD PRESSURE: 104 MMHG | WEIGHT: 244 LBS | BODY MASS INDEX: 34.93 KG/M2 | HEART RATE: 82 BPM | DIASTOLIC BLOOD PRESSURE: 72 MMHG | TEMPERATURE: 97.2 F

## 2021-04-21 DIAGNOSIS — K63.5 POLYP OF ASCENDING COLON, UNSPECIFIED TYPE: Primary | ICD-10-CM

## 2021-04-21 DIAGNOSIS — K63.5 POLYP OF SIGMOID COLON, UNSPECIFIED TYPE: ICD-10-CM

## 2021-04-21 DIAGNOSIS — K63.5 POLYP OF TRANSVERSE COLON, UNSPECIFIED TYPE: ICD-10-CM

## 2021-04-21 PROCEDURE — 250N000011 HC RX IP 250 OP 636: Performed by: NURSE ANESTHETIST, CERTIFIED REGISTERED

## 2021-04-21 PROCEDURE — 45380 COLONOSCOPY AND BIOPSY: CPT | Mod: PT | Performed by: SURGERY

## 2021-04-21 PROCEDURE — 45380 COLONOSCOPY AND BIOPSY: CPT | Mod: PT,XU | Performed by: SURGERY

## 2021-04-21 PROCEDURE — 250N000009 HC RX 250: Performed by: SURGERY

## 2021-04-21 PROCEDURE — 45385 COLONOSCOPY W/LESION REMOVAL: CPT | Mod: PT

## 2021-04-21 PROCEDURE — 45385 COLONOSCOPY W/LESION REMOVAL: CPT | Mod: PT | Performed by: SURGERY

## 2021-04-21 PROCEDURE — 258N000003 HC RX IP 258 OP 636: Performed by: SURGERY

## 2021-04-21 PROCEDURE — 88305 TISSUE EXAM BY PATHOLOGIST: CPT

## 2021-04-21 PROCEDURE — 250N000009 HC RX 250: Performed by: NURSE ANESTHETIST, CERTIFIED REGISTERED

## 2021-04-21 PROCEDURE — 45385 COLONOSCOPY W/LESION REMOVAL: CPT | Performed by: NURSE ANESTHETIST, CERTIFIED REGISTERED

## 2021-04-21 RX ORDER — NALOXONE HYDROCHLORIDE 0.4 MG/ML
0.2 INJECTION, SOLUTION INTRAMUSCULAR; INTRAVENOUS; SUBCUTANEOUS
Status: DISCONTINUED | OUTPATIENT
Start: 2021-04-21 | End: 2021-04-21 | Stop reason: HOSPADM

## 2021-04-21 RX ORDER — LIDOCAINE HYDROCHLORIDE 20 MG/ML
INJECTION, SOLUTION INFILTRATION; PERINEURAL PRN
Status: DISCONTINUED | OUTPATIENT
Start: 2021-04-21 | End: 2021-04-21

## 2021-04-21 RX ORDER — FLUMAZENIL 0.1 MG/ML
0.2 INJECTION, SOLUTION INTRAVENOUS
Status: DISCONTINUED | OUTPATIENT
Start: 2021-04-21 | End: 2021-04-21 | Stop reason: HOSPADM

## 2021-04-21 RX ORDER — ONDANSETRON 2 MG/ML
4 INJECTION INTRAMUSCULAR; INTRAVENOUS EVERY 6 HOURS PRN
Status: DISCONTINUED | OUTPATIENT
Start: 2021-04-21 | End: 2021-04-21 | Stop reason: HOSPADM

## 2021-04-21 RX ORDER — PROCHLORPERAZINE MALEATE 5 MG
10 TABLET ORAL EVERY 6 HOURS PRN
Status: DISCONTINUED | OUTPATIENT
Start: 2021-04-21 | End: 2021-04-21 | Stop reason: HOSPADM

## 2021-04-21 RX ORDER — NALOXONE HYDROCHLORIDE 0.4 MG/ML
0.4 INJECTION, SOLUTION INTRAMUSCULAR; INTRAVENOUS; SUBCUTANEOUS
Status: DISCONTINUED | OUTPATIENT
Start: 2021-04-21 | End: 2021-04-21 | Stop reason: HOSPADM

## 2021-04-21 RX ORDER — LIDOCAINE 40 MG/G
CREAM TOPICAL
Status: DISCONTINUED | OUTPATIENT
Start: 2021-04-21 | End: 2021-04-21 | Stop reason: HOSPADM

## 2021-04-21 RX ORDER — PROPOFOL 10 MG/ML
INJECTION, EMULSION INTRAVENOUS CONTINUOUS PRN
Status: DISCONTINUED | OUTPATIENT
Start: 2021-04-21 | End: 2021-04-21

## 2021-04-21 RX ORDER — SODIUM CHLORIDE, SODIUM LACTATE, POTASSIUM CHLORIDE, CALCIUM CHLORIDE 600; 310; 30; 20 MG/100ML; MG/100ML; MG/100ML; MG/100ML
INJECTION, SOLUTION INTRAVENOUS CONTINUOUS
Status: DISCONTINUED | OUTPATIENT
Start: 2021-04-21 | End: 2021-04-21 | Stop reason: HOSPADM

## 2021-04-21 RX ORDER — ONDANSETRON 2 MG/ML
4 INJECTION INTRAMUSCULAR; INTRAVENOUS
Status: DISCONTINUED | OUTPATIENT
Start: 2021-04-21 | End: 2021-04-21 | Stop reason: HOSPADM

## 2021-04-21 RX ORDER — PROPOFOL 10 MG/ML
INJECTION, EMULSION INTRAVENOUS PRN
Status: DISCONTINUED | OUTPATIENT
Start: 2021-04-21 | End: 2021-04-21

## 2021-04-21 RX ORDER — ONDANSETRON 4 MG/1
4 TABLET, ORALLY DISINTEGRATING ORAL EVERY 6 HOURS PRN
Status: DISCONTINUED | OUTPATIENT
Start: 2021-04-21 | End: 2021-04-21 | Stop reason: HOSPADM

## 2021-04-21 RX ADMIN — PROPOFOL 40 MG: 10 INJECTION, EMULSION INTRAVENOUS at 11:19

## 2021-04-21 RX ADMIN — SODIUM CHLORIDE, POTASSIUM CHLORIDE, SODIUM LACTATE AND CALCIUM CHLORIDE 30 ML/HR: 600; 310; 30; 20 INJECTION, SOLUTION INTRAVENOUS at 10:13

## 2021-04-21 RX ADMIN — LIDOCAINE HYDROCHLORIDE 40 MG: 20 INJECTION, SOLUTION INFILTRATION; PERINEURAL at 11:16

## 2021-04-21 RX ADMIN — PROPOFOL 140 MCG/KG/MIN: 10 INJECTION, EMULSION INTRAVENOUS at 11:16

## 2021-04-21 RX ADMIN — PROPOFOL 120 MG: 10 INJECTION, EMULSION INTRAVENOUS at 11:16

## 2021-04-21 ASSESSMENT — LIFESTYLE VARIABLES: TOBACCO_USE: 1

## 2021-04-21 ASSESSMENT — MIFFLIN-ST. JEOR: SCORE: 1918.03

## 2021-04-21 NOTE — OP NOTE
PROCEDURE NOTE    SURGEON: Lisset Bergeron MD.    PRE-OP DIAGNOSIS:  Screening Colonoscopy      POST-OP DIAGNOSIS: colon polyps, scattered diverticula     Location: Ascending colon x 3 Size: 0.3 cm  Removed:  Y       Transverse x 2  0.3 cm    Y       Sigmoid x 2   0.3 cm    y  PROCEDURE:  Colonoscopy with polypectomies cold snare and cold forceps    ESTIMATEDBLOOD LOSS: none    COMPLICATIONS:  None    SPECIMEN:  Ascending colon, transverse and sigmoid polyps    ANESTHESIA:  See anesthesia note, anesthesia requested due to CHARLIE     INDICATION FOR THE PROCEDURE: The patient is a 60 year old male. The patient has no complaints. I explained to the patient the risks, benefits and alternatives to screening colonoscopy for evaluating the colon for colon polyps and colon cancer. We specifically discussed the risks of bleeding, infection, perforation, potential inability to reach the cecum and the risks of sedation. The patient's questions were answered and the patient wished to proceed. Informed consent paperwork was completed.    PROCEDURE: The patient was taken to the endoscopy suite. Appropriate monitors were attached. The patient was placed in the left lateral decubitus position.Timeout was performed confirming the patient's identity and procedure to be performed. After appropriate sedation was confirmed, digital rectal exam was performed. There was normal tone and no gross abnormality was noted. The lubricated colonoscope was introduced into the anus the colon was insufflated with air. The prep quality was adequate. Under direct visualization the scope was advanced to the cecum. The ileocecal valve was intubated and the terminal ileum inspected. No gross abnormality was noted. The scope was withdrawn back into the cecum. The mucosa of colon was inspected while withdrawing the scope. Three small sessile polyps were noted in the ascending colon and removed with cold forceps. Two sessile polyps were noted in the transverse  colon and removed with cold snare. Two small polyps were noted in the sigmoid colon and removed with cold snare. Scattered diverticula were noted. The scope was retroflexed in the rectum and the anorectal junction was inspected. A small fibroepithelial polyp was noted. No other abnormalities were noted. The scope was returned to a neutral position and the colon was decompressed. The scope was removed. The patient tolerated the procedure with no immediately apparent complication. The patient was taken to recovery in stable condition.  FOLLOW UP:  RECOMMEND high fiber diet, follow up: will call with pathology results.

## 2021-04-21 NOTE — DISCHARGE INSTRUCTIONS
Procedure you had done: colonoscopy with removal of polyps  Your health care provider is:  Tal Crockett  Your surgeon is Dr. Lisset Dennis.   Please call your health care provider or surgeon at [phone] if:    - you feel you are getting worse or having an increase in problems    - fever greater than 101 degrees  - increasing shortness of breath or chest pain  - any signs of infection (increasing redness, swelling, tenderness, warmth, change in appearance, or  increased drainage)  - blood in your urine or stool  - coughing or vomiting blood  - nausea (upset stomach) and vomiting and/or diarrhea that will not stop  - severe pain that is not relieved by medicine, rest or ice  You have had medications for sedation. Please be aware that this can cause drowsiness and impaired judgment for up to 24 hours after your procedure. Do not drive, operate power tools or drink alcohol for 24 hours.  If samples were taken-you will get a phone call and a letter with your results in the next 7-10 days. If you don't get results, please call and let us know!     [facility]  Adult Discharge Orders & Instructions    ________________________________________________________________          For 12 hours after surgery  1. Get plenty of rest.  A responsible adult must stay with you for at least 12 hours after you leave the hospital.   2. You may feel lightheaded.  IF so, sit for a few minutes before standing.  Have someone help you get up.   3. You may have a slight fever. Call the doctor if your fever is over 101 F (38.3 C) (taken under the tongue) or lasts longer than 24 hours.  4. You may have a dry mouth, a sore throat, muscle aches or trouble sleeping.  These should go away after 24 hours.  5. Do not make important or legal decisions.  6.   Do not drive or use heavy equipment.  If you have weakness or tingling, don't drive or use heavy equipment until this feeling goes away.    To contact a doctor, call    [phone]_______________________

## 2021-04-21 NOTE — ANESTHESIA PREPROCEDURE EVALUATION
Anesthesia Pre-Procedure Evaluation    Patient: Watson Price   MRN: [id number] : 1960        Preoperative Diagnosis: Encounter for screening colonoscopy [Z12.11]   Procedure : Procedure(s):  COLONOSCOPY     Past Medical History:   Diagnosis Date     Diverticulosis of large intestine without perforation or abscess without bleeding     1/3/2011, inactive icd-9 diagnosis auto replaced with icd-10, display name retained//mporz     Essential (primary) hypertension     No Comments Provided     Impingement syndrome of right shoulder     2015     Migraine without status migrainosus, not intractable     No Comments Provided     Nodular prostate with lower urinary tract symptoms     No Comments Provided     Other shoulder lesions, right shoulder     2015     Other specified postprocedural states     2016     Right rotator cuff tear     2015      Past Surgical History:   Procedure Laterality Date     COLONOSCOPY  2011    Next due-  hyperplastic polyp Dr Martinez     FRACTURE SURGERY Left      Mandibular fracture with subsequent ORIF     OTHER SURGICAL HISTORY Right 2016      No Known Allergies   Social History     Tobacco Use     Smoking status: Current Every Day Smoker     Types: Cigars     Smokeless tobacco: Never Used     Tobacco comment: 5 cigars a day   Substance Use Topics     Alcohol use: Yes     Alcohol/week: 24.0 standard drinks     Comment: Alcoholic Drinks/day: case of beer per week      Wt Readings from Last 1 Encounters:   21 110.7 kg (244 lb)        Anesthesia Evaluation   Pt has had prior anesthetic.         ROS/MED HX  ENT/Pulmonary: Comment: Smokes 5 cigars a day    (+) CHARLIE risk factors, snores loudly, hypertension, obese, tobacco use, Current use,     Neurologic:  - neg neurologic ROS     Cardiovascular:     (+) hypertension-----    METS/Exercise Tolerance: >4 METS    Hematologic:  - neg hematologic  ROS     Musculoskeletal:  - neg musculoskeletal ROS      GI/Hepatic:  - neg GI/hepatic ROS   (+) bowel prep,     Renal/Genitourinary:  - neg Renal ROS     Endo:  - neg endo ROS     Psychiatric/Substance Use: Comment: Drinks a case of beer per week per H and P        Infectious Disease:  - neg infectious disease ROS     Malignancy:  - neg malignancy ROS     Other:            Physical Exam    Airway        Mallampati: II   TM distance: > 3 FB   Neck ROM: full   Mouth opening: > 3 cm    Respiratory Devices and Support         Dental       (+) upper dentures and lower dentures      Cardiovascular   cardiovascular exam normal          Pulmonary   pulmonary exam normal                OUTSIDE LABS:  CBC:   Lab Results   Component Value Date    WBC 6.9 02/28/2018    HGB 14.9 02/28/2018    HCT 44.5 02/28/2018     02/28/2018     BMP:   Lab Results   Component Value Date     02/19/2021     01/22/2020    POTASSIUM 4.2 02/19/2021    POTASSIUM 4.5 01/22/2020    CHLORIDE 101 02/19/2021    CHLORIDE 105 01/22/2020    CO2 27 02/19/2021    CO2 24 01/22/2020    BUN 19 02/19/2021    BUN 15 01/22/2020    CR 0.93 02/19/2021    CR 0.94 01/22/2020     (H) 02/19/2021    GLC 98 01/22/2020     COAGS: No results found for: PTT, INR, FIBR  POC: No results found for: BGM, HCG, HCGS  HEPATIC:   Lab Results   Component Value Date    ALBUMIN 4.5 01/22/2020    PROTTOTAL 7.6 01/22/2020    ALT 35 01/22/2020    AST 20 01/22/2020    ALKPHOS 45 01/22/2020    BILITOTAL 0.4 01/22/2020     OTHER:   Lab Results   Component Value Date    JAKY 10.1 02/19/2021       Anesthesia Plan    ASA Status:  2   NPO Status:  NPO Appropriate    Anesthesia Type: MAC.              Consents         - Extended Intubation/Ventilatory Support Discussed: No.      - Patient is DNR/DNI Status: No    Use of blood products discussed: No .     Postoperative Care            Comments:                KATHLEEN LOPEZ CRNA

## 2021-04-21 NOTE — ANESTHESIA CARE TRANSFER NOTE
Patient: Watson Price    Procedure(s):  COLONOSCOPY, WITH POLYPECTOMY AND BIOPSY    Diagnosis: Encounter for screening colonoscopy [Z12.11]  Diagnosis Additional Information: No value filed.    Anesthesia Type:   MAC     Note:    Oropharynx: oropharynx clear of all foreign objects  Level of Consciousness: drowsy  Oxygen Supplementation: nasal cannula  Level of Supplemental Oxygen (L/min / FiO2): 2  Independent Airway: airway patency satisfactory and stable  Dentition: dentition unchanged  Vital Signs Stable: post-procedure vital signs reviewed and stable  Report to RN Given: handoff report given  Patient transferred to: Phase II    Handoff Report: Identifed the Patient, Identified the Reponsible Provider, Reviewed the pertinent medical history, Discussed the surgical course, Reviewed Intra-OP anesthesia mangement and issues during anesthesia, Set expectations for post-procedure period and Allowed opportunity for questions and acknowledgement of understanding      Vitals: (Last set prior to Anesthesia Care Transfer)  CRNA VITALS  4/21/2021 1118 - 4/21/2021 1218      4/21/2021             Resp Rate (set):  10        Electronically Signed By: KATHLEEN Fernández CRNA  April 21, 2021  12:29 PM

## 2021-04-21 NOTE — ANESTHESIA POSTPROCEDURE EVALUATION
Patient: Watson Price    Procedure(s):  COLONOSCOPY, WITH POLYPECTOMY AND BIOPSY    Diagnosis:Encounter for screening colonoscopy [Z12.11]  Diagnosis Additional Information: No value filed.    Anesthesia Type:  MAC    Note:  Disposition: Outpatient   Postop Pain Control: Uneventful            Sign Out: Well controlled pain   PONV: No   Neuro/Psych: Uneventful            Sign Out: Acceptable/Baseline neuro status   Airway/Respiratory: Uneventful            Sign Out: Acceptable/Baseline resp. status   CV/Hemodynamics: Uneventful            Sign Out: Acceptable CV status; No obvious hypovolemia; No obvious fluid overload   Other NRE: NONE   DID A NON-ROUTINE EVENT OCCUR? No           Last vitals:  Vitals:    04/21/21 1200 04/21/21 1215 04/21/21 1230   BP: 101/69 99/70 104/72   Pulse: 88 82    Resp: 12 12 12   Temp:      SpO2: 94% 95% 95%       Last vitals prior to Anesthesia Care Transfer:  CRNA VITALS  4/21/2021 1118 - 4/21/2021 1218      4/21/2021             Resp Rate (set):  10          Electronically Signed By: KATHLEEN LOPEZ CRNA  April 21, 2021  12:38 PM

## 2021-04-21 NOTE — H&P
PRE-PROCEDURE NOTE    CHIEF COMPLAINT / REASON FORPROCEDURE:  Need for screening colonoscopy.    PERTINENT HISTORY   Patient with no complaints. Previous colonoscopy 10 years hyperplastic polyp. No diarrhea, constipation, abdominal pain or rectal bleeding. No family history of colon polyps or colon cancer.  Past Medical History:   Diagnosis Date     Diverticulosis of large intestine without perforation or abscess without bleeding     1/3/2011, inactive icd-9 diagnosis auto replaced with icd-10, display name retained//mporz     Essential (primary) hypertension     No Comments Provided     Impingement syndrome of right shoulder     5/21/2015     Migraine without status migrainosus, not intractable     No Comments Provided     Nodular prostate with lower urinary tract symptoms     No Comments Provided     Other shoulder lesions, right shoulder     5/21/2015     Other specified postprocedural states     1/6/2016     Right rotator cuff tear     6/12/2015     Past Surgical History:   Procedure Laterality Date     COLONOSCOPY  01/03/2011    Next due-2021  hyperplastic polyp Dr Martinez     FRACTURE SURGERY Left 2009     Mandibular fracture with subsequent ORIF     OTHER SURGICAL HISTORY Right 01/06/2016     Other:  None  Bleeding tendencies:  No    Relevant Family History:  none    Relevant Social History:  none    A relevant review of systems was performed and was Negative.    ALLERGIES/SENSITIVITIES: No Known Allergies     CURRENTMEDICATIONS:    No current facility-administered medications on file prior to encounter.   metoprolol succinate ER (TOPROL-XL) 200 MG 24 hr tablet, Take 1 tablet (200 mg) by mouth daily increased form 100 milligram daily  simvastatin (ZOCOR) 20 MG tablet, Take 1 tablet (20 mg) by mouth At Bedtime  tamsulosin (FLOMAX) 0.4 MG capsule, Take 2 capsules (0.8 mg) by mouth daily  ketoconazole (NIZORAL) 2 % external cream, Apply topically daily  sildenafil (REVATIO) 20 MG tablet, TAKE 1-3 TABLETS prior TO  "sex (start WITH lower DOSE). DO not TAKE WITH nitrates. Go TO Emergency Department IF erection lasts greater THAN 4 hours.  terbinafine (LAMISIL) 1 % external cream, Apply topically 2 times daily      Current Facility-Administered Medications   Medication     lactated ringers infusion     lidocaine (LMX4) cream     lidocaine 1 % 0.1-1 mL     ondansetron (ZOFRAN) injection 4 mg     sodium chloride (PF) 0.9% PF flush 3 mL     sodium chloride (PF) 0.9% PF flush 3 mL     sodium chloride (PF) 0.9% PF flush 3 mL       PRE-SEDATION ASSESSMENT:    BP (!) 132/92   Pulse 84   Temp 97.4  F (36.3  C) (Tympanic)   Resp 12   Ht 1.778 m (5' 10\")   Wt 110.7 kg (244 lb)   SpO2 94%   BMI 35.01 kg/m    Lung Exam:  Normal  Heart Exam:  Normal    Comment(s):      IMPRESSION:  Need for screening colonoscopy.    PLAN:  I discussed screening colonoscopy with the patient.    "

## 2021-04-26 ENCOUNTER — TELEPHONE (OUTPATIENT)
Dept: FAMILY MEDICINE | Facility: OTHER | Age: 61
End: 2021-04-26

## 2021-04-26 NOTE — TELEPHONE ENCOUNTER
Spouse was calling back and this note was created in order to find out where spouse needed to be transferred too.  Spouse needed to speak with the other nurse Min Owens LPN 4/26/2021 3:12 PM

## 2021-06-02 ENCOUNTER — ALLIED HEALTH/NURSE VISIT (OUTPATIENT)
Dept: FAMILY MEDICINE | Facility: OTHER | Age: 61
End: 2021-06-02
Attending: INTERNAL MEDICINE
Payer: COMMERCIAL

## 2021-06-02 DIAGNOSIS — Z23 NEED FOR ZOSTER VACCINATION: Primary | ICD-10-CM

## 2021-06-02 PROCEDURE — 90750 HZV VACC RECOMBINANT IM: CPT

## 2021-06-02 PROCEDURE — 90471 IMMUNIZATION ADMIN: CPT

## 2021-06-02 NOTE — PROGRESS NOTES
Immunization Documentation - Shingrix #2  Verified patient's first and last name, and . Stated reason for visit today is to receive the Shingrix vaccine. Denied any concerns with previous immunizations. Allergies reviewed. VIS handout(s) reviewed and given to take home. Shingrix prepared and administered IM per standing order. Administration documented in IMMUNIZATIONS (see flowsheet and order for further information). Patient Instructed to wait in lobby for 15 minutes post-injection and notify staff immediately of any reaction.     Lilian Garcia RN ....................  2021   3:09 PM

## 2021-06-24 ENCOUNTER — OFFICE VISIT (OUTPATIENT)
Dept: FAMILY MEDICINE | Facility: OTHER | Age: 61
End: 2021-06-24
Attending: NURSE PRACTITIONER
Payer: COMMERCIAL

## 2021-06-24 VITALS
WEIGHT: 236.4 LBS | HEART RATE: 97 BPM | OXYGEN SATURATION: 95 % | RESPIRATION RATE: 22 BRPM | SYSTOLIC BLOOD PRESSURE: 118 MMHG | TEMPERATURE: 96.3 F | HEIGHT: 70 IN | BODY MASS INDEX: 33.84 KG/M2 | DIASTOLIC BLOOD PRESSURE: 80 MMHG
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
## 2021-06-24 DIAGNOSIS — L98.9 SKIN LESION: Primary | ICD-10-CM

## 2021-06-24 PROCEDURE — G0463 HOSPITAL OUTPT CLINIC VISIT: HCPCS

## 2021-06-24 PROCEDURE — 99213 OFFICE O/P EST LOW 20 MIN: CPT | Performed by: NURSE PRACTITIONER

## 2021-06-24 SDOH — HEALTH STABILITY: MENTAL HEALTH: HOW OFTEN DO YOU HAVE 6 OR MORE DRINKS ON ONE OCCASION?: NOT ASKED

## 2021-06-24 SDOH — HEALTH STABILITY: MENTAL HEALTH: HOW OFTEN DO YOU HAVE A DRINK CONTAINING ALCOHOL?: MONTHLY OR LESS

## 2021-06-24 SDOH — HEALTH STABILITY: MENTAL HEALTH: HOW MANY STANDARD DRINKS CONTAINING ALCOHOL DO YOU HAVE ON A TYPICAL DAY?: NOT ASKED

## 2021-06-24 ASSESSMENT — MIFFLIN-ST. JEOR: SCORE: 1883.55

## 2021-06-24 ASSESSMENT — PAIN SCALES - GENERAL: PAINLEVEL: NO PAIN (0)

## 2021-06-24 NOTE — PROGRESS NOTES
HPI:    Watson Price is a 60 year old male who presents to clinic today for abdominal concerns.  He reports he has a lump above his bellybutton/lower chest area that is been present for the past 1 to 2 months.  This was small and has been slowly getting bigger.  Is only painful if he presses on this.  No injuries to the area that he is aware of.    He also reports about 1 week ago he got a bug bite to his left lower leg.  He had swelling and redness as well as pain.  He had some antibiotics at home left over from an oral infection that started with the M.  He took these and this has resolved his symptoms.      Past Medical History:   Diagnosis Date     Diverticulosis of large intestine without perforation or abscess without bleeding     1/3/2011, inactive icd-9 diagnosis auto replaced with icd-10, display name retained//mporz     Essential (primary) hypertension     No Comments Provided     Impingement syndrome of right shoulder     5/21/2015     Migraine without status migrainosus, not intractable     No Comments Provided     Nodular prostate with lower urinary tract symptoms     No Comments Provided     Other shoulder lesions, right shoulder     5/21/2015     Other specified postprocedural states     1/6/2016     Right rotator cuff tear     6/12/2015         Current Outpatient Medications   Medication Sig Dispense Refill     ketoconazole (NIZORAL) 2 % external cream Apply topically daily 60 g 4     metoprolol succinate ER (TOPROL-XL) 200 MG 24 hr tablet Take 1 tablet (200 mg) by mouth daily increased form 100 milligram daily 90 tablet 3     sildenafil (REVATIO) 20 MG tablet TAKE 1-3 TABLETS prior TO sex (start WITH lower DOSE). DO not TAKE WITH nitrates. Go TO Emergency Department IF erection lasts greater THAN 4 hours. 30 tablet 3     simvastatin (ZOCOR) 20 MG tablet Take 1 tablet (20 mg) by mouth At Bedtime 90 tablet 3     tamsulosin (FLOMAX) 0.4 MG capsule Take 2 capsules (0.8 mg) by mouth daily 180 capsule 3  "    terbinafine (LAMISIL) 1 % external cream Apply topically 2 times daily 30 g 1       No Known Allergies    ROS:  Pertinent positives and negatives are noted in HPI.    EXAM:  /80 (BP Location: Right arm, Patient Position: Sitting, Cuff Size: Adult Regular)   Pulse 97   Temp 96.3  F (35.7  C) (Temporal)   Resp 22   Ht 1.778 m (5' 10\")   Wt 107.2 kg (236 lb 6.4 oz)   SpO2 95%   BMI 33.92 kg/m    General appearance: well appearing male, in no acute distress  Respiratory: clear to auscultation bilaterally  Cardiac: RRR with no murmurs  Abdomen: soft, nontender, no masses or organomegaly  Dermatology: Left leg with faint swelling, healing bug bite with no redness or drainage.  Substernal area of chest with BB sized lesion under the skin that is firm and movable.  Psychological: normal affect, alert and pleasant    ASSESSMENT AND PLAN:    1. Skin lesion    2. Bug bite, initial encounter      Skin lesion to chest under the skin that has the consistency of a BB.  Referred to general surgery for evaluation and consideration of excision.    Bug bite to left lower leg that sounds like it had some infection but is resolved.  Follow-up as needed.      KATHLEEN Nam CNP..................6/24/2021 2:47 PM    "

## 2021-06-24 NOTE — NURSING NOTE
"Chief Complaint   Patient presents with     Mass     lump mid chest     Leg Swelling     and foot, left     Patient presents to clinic with lump mid chest that he noticed about a month ago. He states that he feels it has gotten bigger. He also thinks he may have had some sort of bug bite because he had left leg and foot swelling, but he took antibiotics that he had at home and it seems to be getting better.     Initial /80 (BP Location: Right arm, Patient Position: Sitting, Cuff Size: Adult Regular)   Pulse 97   Temp 96.3  F (35.7  C) (Temporal)   Resp 22   Ht 1.778 m (5' 10\")   Wt 107.2 kg (236 lb 6.4 oz)   SpO2 95%   BMI 33.92 kg/m   Estimated body mass index is 33.92 kg/m  as calculated from the following:    Height as of this encounter: 1.778 m (5' 10\").    Weight as of this encounter: 107.2 kg (236 lb 6.4 oz).         Medication Reconciliation: Complete      Radha Martinez   "

## 2021-06-29 ENCOUNTER — OFFICE VISIT (OUTPATIENT)
Dept: SURGERY | Facility: OTHER | Age: 61
End: 2021-06-29
Attending: SURGERY
Payer: COMMERCIAL

## 2021-06-29 VITALS
SYSTOLIC BLOOD PRESSURE: 140 MMHG | RESPIRATION RATE: 16 BRPM | HEART RATE: 84 BPM | BODY MASS INDEX: 34.01 KG/M2 | DIASTOLIC BLOOD PRESSURE: 80 MMHG | OXYGEN SATURATION: 96 % | WEIGHT: 237 LBS | TEMPERATURE: 97.4 F

## 2021-06-29 DIAGNOSIS — L98.9 SKIN LESION: Primary | ICD-10-CM

## 2021-06-29 PROCEDURE — 11401 EXC TR-EXT B9+MARG 0.6-1 CM: CPT | Performed by: SURGERY

## 2021-06-29 PROCEDURE — 12031 INTMD RPR S/A/T/EXT 2.5 CM/<: CPT

## 2021-06-29 PROCEDURE — 12031 INTMD RPR S/A/T/EXT 2.5 CM/<: CPT | Performed by: SURGERY

## 2021-06-29 PROCEDURE — G0463 HOSPITAL OUTPT CLINIC VISIT: HCPCS | Mod: 25

## 2021-06-29 PROCEDURE — 11402 EXC TR-EXT B9+MARG 1.1-2 CM: CPT | Performed by: SURGERY

## 2021-06-29 PROCEDURE — 88304 TISSUE EXAM BY PATHOLOGIST: CPT

## 2021-06-29 ASSESSMENT — PAIN SCALES - GENERAL: PAINLEVEL: NO PAIN (0)

## 2021-06-29 NOTE — NURSING NOTE
"Chief Complaint   Patient presents with     Procedure     lower chest skin lesion       Initial BP (!) 150/90 (BP Location: Left arm, Patient Position: Sitting, Cuff Size: Adult Large)   Pulse 84   Temp 97.4  F (36.3  C) (Temporal)   Resp 16   Wt 107.5 kg (237 lb)   SpO2 96%   BMI 34.01 kg/m   Estimated body mass index is 34.01 kg/m  as calculated from the following:    Height as of 6/24/21: 1.778 m (5' 10\").    Weight as of this encounter: 107.5 kg (237 lb).  Medication Reconciliation: complete    FOOD SECURITY SCREENING QUESTIONS  Hunger Vital Signs:  Within the past 12 months we worried whether our food would run out before we got money to buy more. Never  Within the past 12 months the food we bought just didn't last and we didn't have money to get more. Never  Martha Lowry LPN 6/29/2021 9:40 AM    Prior to the start of the procedure and with procedural staff participation, I verbally confirmed the patient s identity using two indicators, relevant allergies, that the procedure was appropriate and matched the consent or emergent situation, and that the correct equipment/implants were available. Immediately prior to starting the procedure I conducted the Time Out with the procedural staff and re-confirmed the patient s name, procedure, and site/side. (The Joint Commission universal protocol was followed.)  Yes    Sedation (Moderate or Deep): None  Martha Lowry LPN .......6/29/2021 9:46 AM    "

## 2021-06-29 NOTE — PROGRESS NOTES
SUBJECTIVE:  60 year old male presents for lesion removal. This lesion has been present for awhile but is getting larger.    OBJECTIVE:  BP (!) 140/80 (BP Location: Left arm, Patient Position: Sitting, Cuff Size: Adult Large)   Pulse 84   Temp 97.4  F (36.3  C) (Temporal)   Resp 16   Wt 107.5 kg (237 lb)   SpO2 96%   BMI 34.01 kg/m    General: no acute distress  Skin: 1.0 x 0.4 cm soft tissue nodule with central port    ASSESSMENT:  Lesion size: as above   Defect size: lesion and margin : 1.4 x 0.8 x 0.5 cm -intermediate closure    PROCEDURE:  The pathophysiology of skin lesions and skin cysts was discussed with the patient. The risks, benefits and alternatives to excision of the lesion were discussed with the patient, including the risks of infection,scarring, bruising, bleeding and the possible need for further procedures. The patient expressed understanding and wishes to proceed.    Chloraprep was used to cleanse the skin in the area of the lesion. 1% Lidocaine with epinephrine was infiltrated in the skin and subcutaneous tissue in the area of the lesion. When appropriate anesthesia had been achieved, the lesion was sharply excised with a margin of grossly normal tissue. The wound was closed using 4-0 Monocryl and 5-0 plain gut in layers. Sterile dressing was applied. The specimen was labelled and sent to pathology for evaluation. The procedure was well tolerated without complications. Patient was given post procedure instructions and denied further questions. We will call the patient with pathology results.

## 2021-07-14 ENCOUNTER — HOSPITAL ENCOUNTER (OUTPATIENT)
Dept: CT IMAGING | Facility: OTHER | Age: 61
Discharge: HOME | End: 2021-07-14
Attending: FAMILY MEDICINE | Admitting: FAMILY MEDICINE
Payer: COMMERCIAL

## 2021-07-14 DIAGNOSIS — Z87.891 PERSONAL HISTORY OF TOBACCO USE: ICD-10-CM

## 2021-07-14 PROCEDURE — 71271 CT THORAX LUNG CANCER SCR C-: CPT

## 2021-07-26 DIAGNOSIS — N52.9 ERECTILE DYSFUNCTION, UNSPECIFIED ERECTILE DYSFUNCTION TYPE: ICD-10-CM

## 2021-07-26 NOTE — TELEPHONE ENCOUNTER
[facility] sent Rx request for the following:   sildenafil (REVATIO) 20 MG tablet  SigTAKE 1-3 TABLETS by mouth prior TO sex (start WITH lower DOSE). DO not TAKE WITH nitrates. Go TO Emergency Department IF erection lasts greater THAN 4 hours.    Last Prescription Date:   03/24/2020  Last Fill Qty/Refills:         30, R-3    Last Office Visit:              02/19/2021 (formerly [facility])   Future Office visit:           None noted   Erectile Dysfuction Protocol Failed - 7/26/2021  8:34 AM        Failed - Absence of Alpha Blockers on Med list     Routing for PCP review and consideration given failed protocol. Unable to complete prescription refill per RN Medication Refill Policy.................... Rubina Bass RN ....................  7/26/2021   12:52 PM

## 2021-07-27 RX ORDER — SILDENAFIL CITRATE 20 MG/1
TABLET ORAL
Qty: 30 TABLET | Refills: 3 | Status: SHIPPED | OUTPATIENT
Start: 2021-07-27 | End: 2022-03-03

## 2021-11-02 ENCOUNTER — ALLIED HEALTH/NURSE VISIT (OUTPATIENT)
Dept: FAMILY MEDICINE | Facility: OTHER | Age: 61
End: 2021-11-02
Attending: FAMILY MEDICINE
Payer: COMMERCIAL

## 2021-11-02 DIAGNOSIS — Z23 NEED FOR PROPHYLACTIC VACCINATION AND INOCULATION AGAINST INFLUENZA: Primary | ICD-10-CM

## 2021-11-02 PROCEDURE — G0008 ADMIN INFLUENZA VIRUS VAC: HCPCS

## 2021-11-02 NOTE — NURSING NOTE
Immunization Documentation    Prior to Immunization administration, verified patients identity using patient's name and date of birth. Please see IMMUNIZATIONS  and order for additional information.  Patient / Parent instructed to remain in clinic for 15 minutes and report any adverse reaction to staff immediately.    Was entire vial of medication used? Yes  Vial/Syringe: Anabela Owens LPN  11/2/2021   1:28 PM

## 2021-12-09 ENCOUNTER — IMMUNIZATION (OUTPATIENT)
Dept: FAMILY MEDICINE | Facility: OTHER | Age: 61
End: 2021-12-09
Attending: FAMILY MEDICINE
Payer: COMMERCIAL

## 2021-12-09 PROCEDURE — 91300 PR COVID VAC PFIZER DIL RECON 30 MCG/0.3 ML IM: CPT

## 2021-12-23 DIAGNOSIS — I10 ESSENTIAL HYPERTENSION: ICD-10-CM

## 2021-12-24 NOTE — TELEPHONE ENCOUNTER
[id number] sent Rx request for the following:      Requested Prescriptions   Pending Prescriptions Disp Refills     metoprolol succinate ER (TOPROL-XL) 200 MG 24 hr tablet [Pharmacy Med Name: METOPROLOL SUCC 200MG ER TAB] 90 tablet 2     Sig: TAKE 1 TABLET (200 MG) BY MOUTH DAILY. INCREASED [id number] DAILY       Beta-Blockers Protocol Failed - 12/24/2021  9:26 AM        Failed - Blood pressure under 140/90 in past 12 months     BP Readings from Last 3 Encounters:   06/29/21 (!) 140/80   06/24/21 118/80   04/21/21 104/72           Last Prescription Date:   2/19/2021  Last Fill Qty/Refills:         90, R-3   Last Office Visit:              6/24/2021   Future Office visit:           None  Routing refill request to provider for review/approval because:  Does not meet RN refill criteria due to failed blood pressure. Unable to complete prescription refill per RN Medication Refill Policy. Pau Barnes RN on 12/24/2021 at 9:37 AM

## 2021-12-27 RX ORDER — METOPROLOL SUCCINATE 200 MG/1
TABLET, EXTENDED RELEASE ORAL
Qty: 90 TABLET | Refills: 2 | Status: SHIPPED | OUTPATIENT
Start: 2021-12-27 | End: 2022-03-03

## 2022-01-10 DIAGNOSIS — E78.00 HIGH BLOOD CHOLESTEROL: ICD-10-CM

## 2022-01-12 RX ORDER — SIMVASTATIN 20 MG
20 TABLET ORAL AT BEDTIME
Qty: 90 TABLET | Refills: 4 | Status: SHIPPED | OUTPATIENT
Start: 2022-01-12 | End: 2022-03-03

## 2022-01-12 NOTE — TELEPHONE ENCOUNTER
"[id number] sent Rx request for the following:   simvastatin (ZOCOR) 20 MG tablet   Sig TAKE 1 TABLET (20 MG) BY MOUTH AT BEDTIME    Last Prescription Date:   02/19/2021  Last Fill Qty/Refills:         90, R-3    Last Office Visit:              06/24/2021 ([facility])   Future Office visit:           None noted   Statins Protocol Passed - 1/10/2022  9:50 AM        Passed - LDL on file in past 12 months     Recent Labs   Lab Test 02/19/21  1129   LDL 85             Passed - No abnormal creatine kinase in past 12 months     No lab results found.             Passed - Recent (12 mo) or future (30 days) visit within the authorizing provider's specialty     Patient has had an office visit with the authorizing provider or a provider within the authorizing providers department within the previous 12 mos or has a future within next 30 days. See \"Patient Info\" tab in inbasket, or \"Choose Columns\" in Meds & Orders section of the refill encounter.              Passed - Medication is active on med list        Passed - Patient is age 18 or older         Unable to complete prescription refill per RN Medication Refill Policy.................... Rubina Bass RN ....................  1/12/2022   8:22 AM            "

## 2022-02-28 DIAGNOSIS — N40.1 BENIGN PROSTATIC HYPERPLASIA WITH LOWER URINARY TRACT SYMPTOMS, SYMPTOM DETAILS UNSPECIFIED: ICD-10-CM

## 2022-03-01 RX ORDER — TAMSULOSIN HYDROCHLORIDE 0.4 MG/1
CAPSULE ORAL
Qty: 180 CAPSULE | Refills: 4 | Status: SHIPPED | OUTPATIENT
Start: 2022-03-01 | End: 2022-03-03

## 2022-03-01 NOTE — TELEPHONE ENCOUNTER
[id number] sent Rx request for the following:   tamsulosin (FLOMAX) 0.4 MG capsule  Sig TAKE 2 CAPSULES (0.8 MG) BYMOUTH DAILY    Last Prescription Date:   02/19/2021  Last Fill Qty/Refills:         180, R-3    Last Office Visit:              02/19/2021 ([facility])   Future Office visit:           03/03/2022 ([facility])   Alpha Blockers Failed - 2/28/2022  1:00 AM        Failed - Blood pressure under 140/90 in past 12 months     BP Readings from Last 3 Encounters:   06/29/21 (!) 140/80   06/24/21 118/80   04/21/21 104/72                 Failed - Patient does not have Tadalafil, Vardenafil, or Sildenafil on their medication list     Patient to have upcoming appointment. Will need refill prior. Unable to complete prescription refill per RN Medication Refill Policy.................... Rubina Bass RN ....................  3/1/2022   9:24 AM

## 2022-03-03 ENCOUNTER — OFFICE VISIT (OUTPATIENT)
Dept: FAMILY MEDICINE | Facility: OTHER | Age: 62
End: 2022-03-03
Attending: FAMILY MEDICINE
Payer: COMMERCIAL

## 2022-03-03 VITALS
SYSTOLIC BLOOD PRESSURE: 132 MMHG | OXYGEN SATURATION: 96 % | RESPIRATION RATE: 16 BRPM | WEIGHT: 245.4 LBS | TEMPERATURE: 97.7 F | HEART RATE: 91 BPM | HEIGHT: 71 IN | BODY MASS INDEX: 34.35 KG/M2 | DIASTOLIC BLOOD PRESSURE: 70 MMHG

## 2022-03-03 DIAGNOSIS — B35.1 ONYCHOMYCOSIS DUE TO DERMATOPHYTE: ICD-10-CM

## 2022-03-03 DIAGNOSIS — Z12.5 SCREENING FOR PROSTATE CANCER: ICD-10-CM

## 2022-03-03 DIAGNOSIS — L21.9 SEBORRHEIC DERMATITIS: ICD-10-CM

## 2022-03-03 DIAGNOSIS — N40.1 BENIGN PROSTATIC HYPERPLASIA WITH LOWER URINARY TRACT SYMPTOMS, SYMPTOM DETAILS UNSPECIFIED: ICD-10-CM

## 2022-03-03 DIAGNOSIS — Z00.00 ROUTINE GENERAL MEDICAL EXAMINATION AT A HEALTH CARE FACILITY: Primary | ICD-10-CM

## 2022-03-03 DIAGNOSIS — E78.00 HIGH BLOOD CHOLESTEROL: ICD-10-CM

## 2022-03-03 DIAGNOSIS — F10.20 UNCOMPLICATED ALCOHOL DEPENDENCE (H): ICD-10-CM

## 2022-03-03 DIAGNOSIS — N52.9 ERECTILE DYSFUNCTION, UNSPECIFIED ERECTILE DYSFUNCTION TYPE: ICD-10-CM

## 2022-03-03 DIAGNOSIS — I10 ESSENTIAL HYPERTENSION: ICD-10-CM

## 2022-03-03 LAB
ANION GAP SERPL CALCULATED.3IONS-SCNC: 8 MMOL/L (ref 3–14)
BUN SERPL-MCNC: 16 MG/DL (ref 7–25)
CALCIUM SERPL-MCNC: 9.7 MG/DL (ref 8.6–10.3)
CHLORIDE BLD-SCNC: 103 MMOL/L (ref 98–107)
CHOLEST SERPL-MCNC: 222 MG/DL
CO2 SERPL-SCNC: 26 MMOL/L (ref 21–31)
CREAT SERPL-MCNC: 1.02 MG/DL (ref 0.7–1.3)
FASTING STATUS PATIENT QL REPORTED: ABNORMAL
GFR SERPL CREATININE-BSD FRML MDRD: 83 ML/MIN/1.73M2
GLUCOSE BLD-MCNC: 122 MG/DL (ref 70–105)
HDLC SERPL-MCNC: 42 MG/DL (ref 23–92)
LDLC SERPL CALC-MCNC: ABNORMAL MG/DL
NONHDLC SERPL-MCNC: 180 MG/DL
POTASSIUM BLD-SCNC: 3.9 MMOL/L (ref 3.5–5.1)
PSA SERPL-MCNC: 0.68 UG/L (ref 0–4)
SODIUM SERPL-SCNC: 137 MMOL/L (ref 134–144)
TRIGL SERPL-MCNC: 622 MG/DL

## 2022-03-03 PROCEDURE — 82435 ASSAY OF BLOOD CHLORIDE: CPT | Mod: ZL | Performed by: FAMILY MEDICINE

## 2022-03-03 PROCEDURE — 99396 PREV VISIT EST AGE 40-64: CPT | Performed by: FAMILY MEDICINE

## 2022-03-03 PROCEDURE — 80061 LIPID PANEL: CPT | Mod: ZL | Performed by: FAMILY MEDICINE

## 2022-03-03 PROCEDURE — 84153 ASSAY OF PSA TOTAL: CPT | Mod: ZL | Performed by: FAMILY MEDICINE

## 2022-03-03 PROCEDURE — 36415 COLL VENOUS BLD VENIPUNCTURE: CPT | Mod: ZL | Performed by: FAMILY MEDICINE

## 2022-03-03 RX ORDER — TAMSULOSIN HYDROCHLORIDE 0.4 MG/1
0.8 CAPSULE ORAL DAILY
Qty: 180 CAPSULE | Refills: 4 | Status: SHIPPED | OUTPATIENT
Start: 2022-03-03 | End: 2023-03-09

## 2022-03-03 RX ORDER — SILDENAFIL CITRATE 20 MG/1
TABLET ORAL
Qty: 30 TABLET | Refills: 3 | Status: SHIPPED | OUTPATIENT
Start: 2022-03-03 | End: 2022-03-03

## 2022-03-03 RX ORDER — METOPROLOL SUCCINATE 200 MG/1
200 TABLET, EXTENDED RELEASE ORAL DAILY
Qty: 90 TABLET | Refills: 4 | Status: SHIPPED | OUTPATIENT
Start: 2022-03-03 | End: 2023-03-09

## 2022-03-03 RX ORDER — PRENATAL VIT 91/IRON/FOLIC/DHA 28-975-200
COMBINATION PACKAGE (EA) ORAL 2 TIMES DAILY
Qty: 30 G | Refills: 1 | Status: SHIPPED | OUTPATIENT
Start: 2022-03-03

## 2022-03-03 RX ORDER — SILDENAFIL CITRATE 20 MG/1
TABLET ORAL
Qty: 30 TABLET | Refills: 3 | Status: SHIPPED | OUTPATIENT
Start: 2022-03-03 | End: 2023-03-09

## 2022-03-03 RX ORDER — SIMVASTATIN 20 MG
20 TABLET ORAL AT BEDTIME
Qty: 90 TABLET | Refills: 4 | Status: SHIPPED | OUTPATIENT
Start: 2022-03-03 | End: 2022-05-16

## 2022-03-03 RX ORDER — KETOCONAZOLE 20 MG/G
CREAM TOPICAL DAILY
Qty: 60 G | Refills: 4 | Status: SHIPPED | OUTPATIENT
Start: 2022-03-03

## 2022-03-03 ASSESSMENT — PAIN SCALES - GENERAL: PAINLEVEL: NO PAIN (0)

## 2022-03-03 NOTE — PROGRESS NOTES
SUBJECTIVE:   CC: Watson Price is an 61 year old male who presents for preventative health visit.         Healthy Habits:     Getting at least 3 servings of Calcium per day:  Yes    Bi-annual eye exam:  Yes    Dental care twice a year:  NO    Sleep apnea or symptoms of sleep apnea:  None    Diet:  Regular (no restrictions)    Frequency of exercise:  1 day/week    Duration of exercise:  Less than 15 minutes    Taking medications regularly:  Yes    Medication side effects:  None    PHQ-2 Total Score: 0    Additional concerns today:  No    Ability to successfully perform activities of daily living: Yes, no assistance needed  Home safety:  none identified   Hearing impairment: no, none            Today's PHQ-2 Score:   PHQ-2 ( 1999 [employer]) 3/3/2022   Q1: Little interest or pleasure in doing things 0   Q2: Feeling down, depressed or hopeless 0   PHQ-2 Score 0   PHQ-2 Total Score (12-17 Years)- Positive if 3 or more points; Administer PHQ-A if positive -   Q1: Little interest or pleasure in doing things Not at all   Q2: Feeling down, depressed or hopeless Not at all   PHQ-2 Score 0       Abuse: Current or Past(Physical, Sexual or Emotional)- No  Do you feel safe in your environment? Yes        Social History     Tobacco Use     Smoking status: Current Every Day Smoker     Types: Cigars     Smokeless tobacco: Never Used     Tobacco comment: 5 cigars a day   Substance Use Topics     Alcohol use: Yes     Alcohol/week: 24.0 standard drinks     Comment: Alcoholic Drinks/day: case of beer per week         Alcohol Use 3/3/2022   Prescreen: >3 drinks/day or >7 drinks/week? No   Prescreen: >3 drinks/day or >7 drinks/week? -       Last PSA: No results found for: PSA    Reviewed orders with patient. Reviewed health maintenance and updated orders accordingly - Yes  Lab work is in process  Labs reviewed in [facility]  Current Outpatient Medications   Medication Sig Dispense Refill     ketoconazole (NIZORAL) 2 % external cream Apply  topically daily 60 g 4     metoprolol succinate ER (TOPROL-XL) 200 MG 24 hr tablet Take 1 tablet (200 mg) by mouth daily 90 tablet 4     sildenafil (REVATIO) 20 MG tablet TAKE 1-3 TABLETS by mouth prior TO sex (start WITH lower DOSE). DO not TAKE WITH nitrates. Go TO Emergency Department IF erection lasts greater THAN 4 hours. 30 tablet 3     simvastatin (ZOCOR) 20 MG tablet Take 1 tablet (20 mg) by mouth At Bedtime 90 tablet 4     tamsulosin (FLOMAX) 0.4 MG capsule Take 2 capsules (0.8 mg) by mouth daily 180 capsule 4     terbinafine (LAMISIL) 1 % external cream Apply topically 2 times daily 30 g 1     No Known Allergies    Reviewed and updated as needed this visit by clinical staff   Tobacco  Allergies  Meds   Med Hx    Soc Hx        Reviewed and updated as needed this visit by Provider                 Past Medical History:   Diagnosis Date     Diverticulosis of large intestine without perforation or abscess without bleeding     1/3/2011, inactive icd-9 diagnosis auto replaced with icd-10, display name retained//mporz     Essential (primary) hypertension     No Comments Provided     Impingement syndrome of right shoulder     5/21/2015     Migraine without status migrainosus, not intractable     No Comments Provided     Nodular prostate with lower urinary tract symptoms     No Comments Provided     Other shoulder lesions, right shoulder     5/21/2015     Other specified postprocedural states     1/6/2016     Right rotator cuff tear     6/12/2015      Past Surgical History:   Procedure Laterality Date     COLONOSCOPY  01/03/2011    Next due-2021  hyperplastic polyp Dr Martinez     COLONOSCOPY N/A 04/21/2021    adnomatous, f/u 3 years     FRACTURE SURGERY Left 2009     Mandibular fracture with subsequent ORIF     OTHER SURGICAL HISTORY Right 01/06/2016       Review of Systems  CONSTITUTIONAL: NEGATIVE for fever, chills, change in weight  INTEGUMENTARY/SKIN: NEGATIVE for worrisome rashes, moles or lesions  EYES:  "NEGATIVE for vision changes or irritation  ENT: NEGATIVE for ear, mouth and throat problems  RESP: NEGATIVE for significant cough or SOB  CV: NEGATIVE for chest pain, palpitations or peripheral edema  GI: NEGATIVE for nausea, abdominal pain, heartburn, or change in bowel habits   male: negative for dysuria, hematuria, decreased urinary stream, erectile dysfunction, urethral discharge  MUSCULOSKELETAL: NEGATIVE for significant arthralgias or myalgia  NEURO: NEGATIVE for weakness, dizziness or paresthesias  PSYCHIATRIC: NEGATIVE for changes in mood or affect    OBJECTIVE:   /70   Pulse 91   Temp 97.7  F (36.5  C)   Resp 16   Ht 1.797 m (5' 10.75\")   Wt 111.3 kg (245 lb 6.4 oz)   SpO2 96%   BMI 34.47 kg/m      Physical Exam  GENERAL: healthy, alert and no distress  EYES: Eyes grossly normal to inspection, PERRL and conjunctivae and sclerae normal  HENT: ear canals and TM's normal, nose and mouth without ulcers or lesions  NECK: no adenopathy, no asymmetry, masses, or scars and thyroid normal to palpation  RESP: lungs clear to auscultation - no rales, rhonchi or wheezes  CV: regular rate and rhythm, normal S1 S2, no S3 or S4, no murmur, click or rub, no peripheral edema and peripheral pulses strong  ABDOMEN: soft, nontender, no hepatosplenomegaly, no masses and bowel sounds normal  MS: no gross musculoskeletal defects noted, no edema  SKIN: no suspicious lesions or rashes  NEURO: Normal strength and tone, mentation intact and speech normal  PSYCH: mentation appears normal, affect normal/bright    Diagnostic Test Results:  Labs reviewed in Epic  Results for orders placed or performed in visit on 03/03/22   Lipid Panel     Status: Abnormal   Result Value Ref Range    Cholesterol 222 (H) <200 mg/dL    Triglycerides 622 (H) <150 mg/dL    Direct Measure HDL 42 23 - 92 mg/dL    LDL Cholesterol Calculated      Non HDL Cholesterol 180 (H) <130 mg/dL    Patient Fasting > 8hrs? Unknown     Narrative    " Cholesterol  Desirable:  <200 mg/dL    Triglycerides  Normal:  Less than 150 mg/dL  Borderline High:  150-199 mg/dL  High:  200-499 mg/dL  Very High:  Greater than or equal to 500 mg/dL    Direct Measure HDL  Female:  Greater than or equal to 50 mg/dL   Male:  Greater than or equal to 40 mg/dL    LDL Cholesterol  Desirable:  <100mg/dL  Above Desirable:  100-129 mg/dL   Borderline High:  130-159 mg/dL   High:  160-189 mg/dL   Very High:  >= 190 mg/dL    Non HDL Cholesterol  Desirable:  130 mg/dL  Above Desirable:  130-159 mg/dL  Borderline High:  160-189 mg/dL  High:  190-219 mg/dL  Very High:  Greater than or equal to 220 mg/dL   PSA Screen GH     Status: Normal   Result Value Ref Range    Prostate Specific Antigen Screen 0.68 0.00 - 4.00 ug/L    Narrative    The Rev[employer] Access PSAS WHO assay is a two site immunoenzymatic   assay. Assay values obtained with different assay methods cannot be used   interchangeably due to differences in assay methods and reagent specificity.   Basic Metabolic Panel     Status: Abnormal   Result Value Ref Range    Sodium 137 134 - 144 mmol/L    Potassium 3.9 3.5 - 5.1 mmol/L    Chloride 103 98 - 107 mmol/L    Carbon Dioxide (CO2) 26 21 - 31 mmol/L    Anion Gap 8 3 - 14 mmol/L    Urea Nitrogen 16 7 - 25 mg/dL    Creatinine 1.02 0.70 - 1.30 mg/dL    Calcium 9.7 8.6 - 10.3 mg/dL    Glucose 122 (H) 70 - 105 mg/dL    GFR Estimate 83 >60 mL/min/1.73m2         ASSESSMENT/PLAN:       ICD-10-CM    1. Routine general medical examination at a health care facility  Z00.00    2. Benign prostatic hyperplasia with lower urinary tract symptoms, symptom details unspecified  N40.1 tamsulosin (FLOMAX) 0.4 MG capsule   3. Onychomycosis due to dermatophyte  B35.1 terbinafine (LAMISIL) 1 % external cream   4. Erectile dysfunction, unspecified erectile dysfunction type  N52.9 sildenafil (REVATIO) 20 MG tablet     DISCONTINUED: sildenafil (REVATIO) 20 MG tablet   5. Seborrheic dermatitis  L21.9 ketoconazole  "(NIZORAL) 2 % external cream   6. Essential hypertension  I10 metoprolol succinate ER (TOPROL-XL) 200 MG 24 hr tablet     Basic Metabolic Panel   7. High blood cholesterol  E78.00 simvastatin (ZOCOR) 20 MG tablet     Lipid Panel   8. Screening for prostate cancer  Z12.5 PSA Screen GH     Advised less EtOH, not ready yet.  Refilled meds without changes.  Will get a follow up triglyceride level in 2 months or so, fasting. If still elevated over 500, then start statin.        COUNSELING:   Reviewed preventive health counseling, as reflected in patient instructions       Regular exercise       Healthy diet/nutrition       Prostate cancer screening    Estimated body mass index is 34.47 kg/m  as calculated from the following:    Height as of this encounter: 1.797 m (5' 10.75\").    Weight as of this encounter: 111.3 kg (245 lb 6.4 oz).     Weight management plan: Discussed healthy diet and exercise guidelines    He reports that he has been smoking cigars. He has never used smokeless tobacco.  Tobacco Cessation Action Plan:   Information offered: Patient not interested at this time      Counseling Resources:  ATP IV Guidelines  Pooled Cohorts Equation Calculator  FRAX Risk Assessment  ICSI Preventive Guidelines  Dietary Guidelines for Americans, 2010  USDA's MyPlate  ASA Prophylaxis  Lung CA Screening    Tal Crockett MD  [facility]    "

## 2022-03-03 NOTE — NURSING NOTE
"Chief Complaint   Patient presents with     Physical     medication refilled       Initial /70   Pulse 91   Temp 97.7  F (36.5  C)   Resp 16   Ht 1.797 m (5' 10.75\")   Wt 111.3 kg (245 lb 6.4 oz)   SpO2 96%   BMI 34.47 kg/m   Estimated body mass index is 34.47 kg/m  as calculated from the following:    Height as of this encounter: 1.797 m (5' 10.75\").    Weight as of this encounter: 111.3 kg (245 lb 6.4 oz).  Medication Reconciliation: complete.  FOOD SECURITY SCREENING QUESTIONS  Hunger Vital Signs:  Within the past 12 months we worried whether our food would run out before we got money to buy more. Never  Within the past 12 months the food we bought just didn't last and we didn't have money to get more. Never  Anna David LPN 3/3/2022 12:48 PM      Anna David LPN  "

## 2022-03-03 NOTE — LETTER
March 4, 2022      Watson Price  [address] [address]        Dear ,    We are writing to inform you of your test results.    The triglycerides are very high.  This can be from the alcohol, or can be high if you had eaten before we did the labs.  Come in for a repeat in a few weeks or so, but make sure to fast for 8 hours before.  If still over 500, then meds are needed to protect your pancreas.      Resulted Orders   Lipid Panel   Result Value Ref Range    Cholesterol 222 (H) <200 mg/dL    Triglycerides 622 (H) <150 mg/dL    Direct Measure HDL 42 23 - 92 mg/dL    LDL Cholesterol Calculated        Comment:      Cannot estimate LDL when triglyceride exceeds 400 mg/dL    Non HDL Cholesterol 180 (H) <130 mg/dL    Patient Fasting > 8hrs? Unknown     Narrative    Cholesterol  Desirable:  <200 mg/dL    Triglycerides  Normal:  Less than 150 mg/dL  Borderline High:  150-199 mg/dL  High:  200-499 mg/dL  Very High:  Greater than or equal to 500 mg/dL    Direct Measure HDL  Female:  Greater than or equal to 50 mg/dL   Male:  Greater than or equal to 40 mg/dL    LDL Cholesterol  Desirable:  <100mg/dL  Above Desirable:  100-129 mg/dL   Borderline High:  130-159 mg/dL   High:  160-189 mg/dL   Very High:  >= 190 mg/dL    Non HDL Cholesterol  Desirable:  130 mg/dL  Above Desirable:  130-159 mg/dL  Borderline High:  160-189 mg/dL  High:  190-219 mg/dL  Very High:  Greater than or equal to 220 mg/dL   PSA Screen GH   Result Value Ref Range    Prostate Specific Antigen Screen 0.68 0.00 - 4.00 ug/L    Narrative    The DXI Access PSAS WHO assay is a two site immunoenzymatic   assay. Assay values obtained with different assay methods cannot be used   interchangeably due to differences in assay methods and reagent specificity.   Basic Metabolic Panel   Result Value Ref Range    Sodium 137 134 - 144 mmol/L    Potassium 3.9 3.5 - 5.1 mmol/L    Chloride 103 98 - 107 mmol/L    Carbon Dioxide (CO2) 26 21 - 31 mmol/L     Anion Gap 8 3 - 14 mmol/L    Urea Nitrogen 16 7 - 25 mg/dL    Creatinine 1.02 0.70 - 1.30 mg/dL    Calcium 9.7 8.6 - 10.3 mg/dL    Glucose 122 (H) 70 - 105 mg/dL    GFR Estimate 83 >60 mL/min/1.73m2      Comment:      Effective December 21, 2021 eGFRcr in adults is calculated using the 2021 CKD-EPI creatinine equation which includes age and gender (Vivian et al., NEJM, DOI: 10.1056/ZHISnq9123487)       If you have any questions or concerns, please call the clinic at the number listed above.       Sincerely,      Tal Crockett MD

## 2022-04-27 ENCOUNTER — LAB (OUTPATIENT)
Dept: LAB | Facility: OTHER | Age: 62
End: 2022-04-27
Attending: FAMILY MEDICINE
Payer: COMMERCIAL

## 2022-04-27 DIAGNOSIS — E78.1 HYPERTRIGLYCERIDEMIA: Primary | ICD-10-CM

## 2022-04-27 DIAGNOSIS — E78.1 HYPERTRIGLYCERIDEMIA: ICD-10-CM

## 2022-04-27 LAB
CHOLEST SERPL-MCNC: 203 MG/DL
FASTING STATUS PATIENT QL REPORTED: YES
HBA1C MFR BLD: 5.6 % (ref 4–6.2)
HDLC SERPL-MCNC: 36 MG/DL (ref 23–92)
LDLC SERPL CALC-MCNC: ABNORMAL MG/DL
NONHDLC SERPL-MCNC: 167 MG/DL
TRIGL SERPL-MCNC: 594 MG/DL
TSH SERPL DL<=0.005 MIU/L-ACNC: 1.18 MU/L (ref 0.4–4)

## 2022-04-27 PROCEDURE — 80061 LIPID PANEL: CPT | Mod: ZL

## 2022-04-27 PROCEDURE — 83036 HEMOGLOBIN GLYCOSYLATED A1C: CPT | Mod: ZL

## 2022-04-27 PROCEDURE — 36415 COLL VENOUS BLD VENIPUNCTURE: CPT | Mod: ZL

## 2022-04-27 PROCEDURE — 84443 ASSAY THYROID STIM HORMONE: CPT | Mod: ZL

## 2022-05-06 ENCOUNTER — TELEPHONE (OUTPATIENT)
Dept: FAMILY MEDICINE | Facility: OTHER | Age: 62
End: 2022-05-06
Payer: COMMERCIAL

## 2022-05-06 DIAGNOSIS — E78.1 HYPERTRIGLYCERIDEMIA: Primary | ICD-10-CM

## 2022-05-06 RX ORDER — ATORVASTATIN CALCIUM 40 MG/1
40 TABLET, FILM COATED ORAL DAILY
Qty: 90 TABLET | Refills: 4 | Status: SHIPPED | OUTPATIENT
Start: 2022-05-06 | End: 2022-05-16

## 2022-05-06 NOTE — TELEPHONE ENCOUNTER
Patient has decided to try the Lipitor. Please send a prescription to [facility] on 5/6/2022 at 9:20 AM

## 2022-05-10 NOTE — TELEPHONE ENCOUNTER
Patient is waiting for a RX for her Liptor. He was waiting for it to be called in.    [facility] ([place])    Tere Leigh on 5/10/2022 at 8:26 AM

## 2022-05-11 ENCOUNTER — TELEPHONE (OUTPATIENT)
Dept: FAMILY MEDICINE | Facility: OTHER | Age: 62
End: 2022-05-11
Payer: COMMERCIAL

## 2022-05-11 NOTE — TELEPHONE ENCOUNTER
Please call the patients wife.  He would like to get the RX - Lipitor.   Pharmacy is [facility].          Eneida Rojas on 5/11/2022 at 3:50 PM

## 2022-05-12 NOTE — TELEPHONE ENCOUNTER
Patient notified of below. He is wondering how long he should wait before he has his labs rechecked again?    Rebekah Peralta LPN.................. 5/12/2022 11:17 AM

## 2022-05-12 NOTE — TELEPHONE ENCOUNTER
Patient would like to know if he needs to be on Lipitor and Simvastatin?      Martha Owens LPN 5/12/2022 10:53 AM

## 2022-05-16 ENCOUNTER — TELEPHONE (OUTPATIENT)
Dept: FAMILY MEDICINE | Facility: OTHER | Age: 62
End: 2022-05-16
Payer: COMMERCIAL

## 2022-05-16 DIAGNOSIS — E78.1 HYPERTRIGLYCERIDEMIA: ICD-10-CM

## 2022-05-16 RX ORDER — ATORVASTATIN CALCIUM 40 MG/1
40 TABLET, FILM COATED ORAL DAILY
Qty: 90 TABLET | Refills: 4 | Status: SHIPPED | OUTPATIENT
Start: 2022-05-16 | End: 2023-03-09

## 2022-05-16 NOTE — TELEPHONE ENCOUNTER
Saying has done labs and trying to get Lipitor now-per  letter they rec'd. Has called a few times-does not need labs until after 3 months of the Lipitor it was told to them--Uses [facility] [place]-Same Day Call Back Please

## 2022-07-26 ENCOUNTER — OFFICE VISIT (OUTPATIENT)
Dept: INTERNAL MEDICINE | Facility: OTHER | Age: 62
End: 2022-07-26
Attending: STUDENT IN AN ORGANIZED HEALTH CARE EDUCATION/TRAINING PROGRAM
Payer: COMMERCIAL

## 2022-07-26 VITALS
OXYGEN SATURATION: 95 % | HEIGHT: 71 IN | RESPIRATION RATE: 20 BRPM | BODY MASS INDEX: 34.66 KG/M2 | TEMPERATURE: 97.7 F | DIASTOLIC BLOOD PRESSURE: 78 MMHG | SYSTOLIC BLOOD PRESSURE: 128 MMHG | HEART RATE: 76 BPM | WEIGHT: 247.6 LBS

## 2022-07-26 DIAGNOSIS — L60.0 INGROWN TOENAIL: Primary | ICD-10-CM

## 2022-07-26 PROBLEM — E66.01 MORBID OBESITY (H): Status: ACTIVE | Noted: 2022-07-26

## 2022-07-26 PROCEDURE — 99213 OFFICE O/P EST LOW 20 MIN: CPT | Performed by: STUDENT IN AN ORGANIZED HEALTH CARE EDUCATION/TRAINING PROGRAM

## 2022-07-26 PROCEDURE — G0463 HOSPITAL OUTPT CLINIC VISIT: HCPCS

## 2022-07-26 RX ORDER — CEPHALEXIN 500 MG/1
500 CAPSULE ORAL 2 TIMES DAILY
Qty: 10 CAPSULE | Refills: 0 | Status: SHIPPED | OUTPATIENT
Start: 2022-07-26 | End: 2023-03-09

## 2022-07-26 ASSESSMENT — PAIN SCALES - GENERAL: PAINLEVEL: MODERATE PAIN (5)

## 2022-07-26 NOTE — PROGRESS NOTES
"      Lou Chaudhari is a 61 year old accompanied by his spouse, presenting for the following health issues:  Toenail (Left great toenail ingrown and infected    )      Toenail    History of Present Illness       Reason for visit:  Bad toe nail    He eats 2-3 servings of fruits and vegetables daily.He consumes 1 sweetened beverage(s) daily.He exercises with enough effort to increase his heart rate 20 to 29 minutes per day.  He exercises with enough effort to increase his heart rate 4 days per week.   He is taking medications regularly.       LEFT GREAT TOENAIL INGROWN AND INFECTED  Has use a special tool from his shop to cut his toenail.  He states that he noticed his toenail was red swollen and incredibly painful from a growing into the skin a few days ago.  He soaked in Epson salt and this seemed to help it.  It is not the joint but the toenail and the skin itself.      Review of Systems         Objective    Pulse 76   Temp 97.7  F (36.5  C) (Temporal)   Resp 20   Ht 1.791 m (5' 10.5\")   Wt 112.3 kg (247 lb 9.6 oz)   SpO2 95%   BMI 35.02 kg/m    Body mass index is 35.02 kg/m .  Physical Exam   General: Pleasant 61-year-old man sitting clinic no acute distress accompanied by his wife.  Skin: Erythema of the lateral great toe resolving thickened onychomycotic great toe of the left foot.        Assessment & Plan       ICD-10-CM    1. Ingrown toenail  L60.0 Orthopedic  Referral     cephALEXin (KEFLEX) 500 MG capsule   2. Morbid obesity (H)  E66.01      Ingrown toenail: Left great toe lateral edge of toenail with erythema of the surrounding skin which is apparently resolving from prior.  Consistent with ingrown toenail and some possible cellulitis.  Recommended supportive cares and referral to podiatry for removal of said toenail as this will continue to happen.  If he notices worsening erythema then he will initiate a cephalexin prescription that was sent to his pharmacy but I recommend watchful " waiting at this point and supportive cares with acetaminophen, heat and ice as needed and eventual removal of the toenail.        Encouraged weight loss and regular exercise.     No follow-ups on file.    Jostin Sood MD  [facility]              .  ..

## 2022-07-26 NOTE — NURSING NOTE
"Chief Complaint   Patient presents with     Toenail     Left great toenail ingrown and infected           Medication reconciliation completed.    FOOD SECURITY SCREENING QUESTIONS:    The next two questions are to help us understand your food security.  If you are feeling you need any assistance in this area, we have resources available to support you today.    Hunger Vital Signs:  Within the past 12 months we worried whether our food would run out before we got money to buy more. Never  Within the past 12 months the food we bought just didn't last and we didn't have money to get more. Never    Initial Pulse 76   Temp 97.7  F (36.5  C) (Temporal)   Resp 20   Ht 1.791 m (5' 10.5\")   Wt 112.3 kg (247 lb 9.6 oz)   SpO2 95%   BMI 35.02 kg/m   Estimated body mass index is 35.02 kg/m  as calculated from the following:    Height as of this encounter: 1.791 m (5' 10.5\").    Weight as of this encounter: 112.3 kg (247 lb 9.6 oz).       Natacha Quezada LPN .......  7/26/2022  3:02 PM  "

## 2022-08-25 ENCOUNTER — TRANSFERRED RECORDS (OUTPATIENT)
Dept: HEALTH INFORMATION MANAGEMENT | Facility: OTHER | Age: 62
End: 2022-08-25

## 2022-09-14 ENCOUNTER — TRANSFERRED RECORDS (OUTPATIENT)
Dept: HEALTH INFORMATION MANAGEMENT | Facility: OTHER | Age: 62
End: 2022-09-14

## 2023-03-09 ENCOUNTER — OFFICE VISIT (OUTPATIENT)
Dept: FAMILY MEDICINE | Facility: OTHER | Age: 63
End: 2023-03-09
Attending: FAMILY MEDICINE
Payer: COMMERCIAL

## 2023-03-09 VITALS
SYSTOLIC BLOOD PRESSURE: 128 MMHG | HEART RATE: 76 BPM | OXYGEN SATURATION: 96 % | TEMPERATURE: 97.6 F | RESPIRATION RATE: 19 BRPM | DIASTOLIC BLOOD PRESSURE: 76 MMHG | BODY MASS INDEX: 36.51 KG/M2 | HEIGHT: 70 IN | WEIGHT: 255 LBS

## 2023-03-09 DIAGNOSIS — Z87.891 PERSONAL HISTORY OF TOBACCO USE: ICD-10-CM

## 2023-03-09 DIAGNOSIS — E78.1 HYPERTRIGLYCERIDEMIA: ICD-10-CM

## 2023-03-09 DIAGNOSIS — F17.200 NICOTINE DEPENDENCE, UNCOMPLICATED, UNSPECIFIED NICOTINE PRODUCT TYPE: ICD-10-CM

## 2023-03-09 DIAGNOSIS — N52.9 ERECTILE DYSFUNCTION, UNSPECIFIED ERECTILE DYSFUNCTION TYPE: ICD-10-CM

## 2023-03-09 DIAGNOSIS — Z12.5 SCREENING FOR PROSTATE CANCER: ICD-10-CM

## 2023-03-09 DIAGNOSIS — E78.00 HIGH BLOOD CHOLESTEROL: ICD-10-CM

## 2023-03-09 DIAGNOSIS — Z00.00 ROUTINE GENERAL MEDICAL EXAMINATION AT A HEALTH CARE FACILITY: Primary | ICD-10-CM

## 2023-03-09 DIAGNOSIS — I10 ESSENTIAL HYPERTENSION: ICD-10-CM

## 2023-03-09 DIAGNOSIS — N40.1 BENIGN PROSTATIC HYPERPLASIA WITH LOWER URINARY TRACT SYMPTOMS, SYMPTOM DETAILS UNSPECIFIED: ICD-10-CM

## 2023-03-09 LAB
CHOLEST SERPL-MCNC: 156 MG/DL
FASTING STATUS PATIENT QL REPORTED: NORMAL
GLUCOSE SERPL-MCNC: 93 MG/DL (ref 70–99)
HDLC SERPL-MCNC: 37 MG/DL
HOLD SPECIMEN: NORMAL
LDLC SERPL CALC-MCNC: ABNORMAL MG/DL
NONHDLC SERPL-MCNC: 119 MG/DL
PSA SERPL-MCNC: 1.08 NG/ML (ref 0–4.5)
TRIGL SERPL-MCNC: 447 MG/DL

## 2023-03-09 PROCEDURE — 36415 COLL VENOUS BLD VENIPUNCTURE: CPT | Mod: ZL | Performed by: FAMILY MEDICINE

## 2023-03-09 PROCEDURE — 80061 LIPID PANEL: CPT | Mod: ZL | Performed by: FAMILY MEDICINE

## 2023-03-09 PROCEDURE — 82947 ASSAY GLUCOSE BLOOD QUANT: CPT | Mod: ZL | Performed by: FAMILY MEDICINE

## 2023-03-09 PROCEDURE — 99396 PREV VISIT EST AGE 40-64: CPT | Mod: 25 | Performed by: FAMILY MEDICINE

## 2023-03-09 PROCEDURE — 90677 PCV20 VACCINE IM: CPT

## 2023-03-09 PROCEDURE — G0296 VISIT TO DETERM LDCT ELIG: HCPCS | Performed by: FAMILY MEDICINE

## 2023-03-09 PROCEDURE — G0103 PSA SCREENING: HCPCS | Mod: ZL | Performed by: FAMILY MEDICINE

## 2023-03-09 PROCEDURE — 84153 ASSAY OF PSA TOTAL: CPT | Mod: ZL | Performed by: FAMILY MEDICINE

## 2023-03-09 RX ORDER — METOPROLOL SUCCINATE 200 MG/1
200 TABLET, EXTENDED RELEASE ORAL DAILY
Qty: 90 TABLET | Refills: 4 | Status: SHIPPED | OUTPATIENT
Start: 2023-03-09 | End: 2024-03-14

## 2023-03-09 RX ORDER — ATORVASTATIN CALCIUM 40 MG/1
40 TABLET, FILM COATED ORAL DAILY
Qty: 90 TABLET | Refills: 4 | Status: SHIPPED | OUTPATIENT
Start: 2023-03-09 | End: 2024-03-14

## 2023-03-09 RX ORDER — TAMSULOSIN HYDROCHLORIDE 0.4 MG/1
0.8 CAPSULE ORAL DAILY
Qty: 180 CAPSULE | Refills: 4 | Status: SHIPPED | OUTPATIENT
Start: 2023-03-09 | End: 2024-03-14

## 2023-03-09 RX ORDER — VARENICLINE TARTRATE 1 MG/1
1 TABLET, FILM COATED ORAL 2 TIMES DAILY
Qty: 180 TABLET | Refills: 0 | Status: SHIPPED | OUTPATIENT
Start: 2023-04-08

## 2023-03-09 RX ORDER — SILDENAFIL CITRATE 20 MG/1
TABLET ORAL
Qty: 30 TABLET | Refills: 3 | Status: SHIPPED | OUTPATIENT
Start: 2023-03-09 | End: 2023-03-21

## 2023-03-09 RX ORDER — VARENICLINE TARTRATE 0.5 MG/1
TABLET, FILM COATED ORAL
Qty: 95 TABLET | Refills: 0 | Status: SHIPPED | OUTPATIENT
Start: 2023-03-09

## 2023-03-09 ASSESSMENT — ENCOUNTER SYMPTOMS
FEVER: 0
FREQUENCY: 0
ARTHRALGIAS: 1
NERVOUS/ANXIOUS: 0
NAUSEA: 0
JOINT SWELLING: 0
DIARRHEA: 0
EYE PAIN: 0
CONSTIPATION: 0
HEARTBURN: 0
HEADACHES: 0
SHORTNESS OF BREATH: 1
PALPITATIONS: 0
WEAKNESS: 0
PARESTHESIAS: 0
DYSURIA: 0
HEMATOCHEZIA: 0
COUGH: 0
CHILLS: 0
SORE THROAT: 0
ABDOMINAL PAIN: 0
HEMATURIA: 0
MYALGIAS: 1
DIZZINESS: 0

## 2023-03-09 ASSESSMENT — PAIN SCALES - GENERAL: PAINLEVEL: NO PAIN (0)

## 2023-03-09 NOTE — LETTER
March 10, 2023      Watson Price  [address] [address]        Dear ,    We are writing to inform you of your test results.    Triglycerides are improved but not normal. Stopping all alcohol would likely improve this. Otherwise labs are normal.    Resulted Orders   Glucose   Result Value Ref Range    Glucose 93 70 - 99 mg/dL    Patient Fasting > 8hrs? Unknown    Lipid Profile   Result Value Ref Range    Cholesterol 156 <200 mg/dL    Triglycerides 447 (H) <150 mg/dL    Direct Measure HDL 37 (L) >=40 mg/dL    LDL Cholesterol Calculated        Comment:      Cannot estimate LDL when triglyceride exceeds 400 mg/dL    Non HDL Cholesterol 119 <130 mg/dL    Narrative    Cholesterol  Desirable:  <200 mg/dL    Triglycerides  Normal:  Less than 150 mg/dL  Borderline High:  150-199 mg/dL  High:  200-499 mg/dL  Very High:  Greater than or equal to 500 mg/dL    Direct Measure HDL  Female:  Greater than or equal to 50 mg/dL   Male:  Greater than or equal to 40 mg/dL    LDL Cholesterol  Desirable:  <100mg/dL  Above Desirable:  100-129 mg/dL   Borderline High:  130-159 mg/dL   High:  160-189 mg/dL   Very High:  >= 190 mg/dL    Non HDL Cholesterol  Desirable:  130 mg/dL  Above Desirable:  130-159 mg/dL  Borderline High:  160-189 mg/dL  High:  190-219 mg/dL  Very High:  Greater than or equal to 220 mg/dL   Prostate Specific Antigen Screen   Result Value Ref Range    Prostate Specific Antigen Screen 1.08 0.00 - 4.50 ng/mL    Narrative    This result is obtained using the Roche Elecsys total PSA method on the shaye e601 immunoassay analyzer. Results obtained with different assay methods or kits cannot be used interchangeably.       If you have any questions or concerns, please call the clinic at the number listed above.       Sincerely,      Tal Crockett MD

## 2023-03-09 NOTE — PROGRESS NOTES
SUBJECTIVE:   CC: Watson is an 62 year old who presents for preventative health visit.     Patient has been advised of split billing requirements and indicates understanding: Yes  Healthy Habits:     Getting at least 3 servings of Calcium per day:  Yes    Bi-annual eye exam:  Yes    Dental care twice a year:  NO    Sleep apnea or symptoms of sleep apnea:  None    Diet:  Regular (no restrictions)    Frequency of exercise:  None    Taking medications regularly:  Yes    Medication side effects:  None    PHQ-2 Total Score: 0    Additional concerns today:  Yes              Today's PHQ-2 Score:   PHQ-2 ( 1999 [employer]) 3/9/2023   Q1: Little interest or pleasure in doing things 0   Q2: Feeling down, depressed or hopeless 0   PHQ-2 Score 0   PHQ-2 Total Score (12-17 Years)- Positive if 3 or more points; Administer PHQ-A if positive -   Q1: Little interest or pleasure in doing things Not at all   Q2: Feeling down, depressed or hopeless Not at all   PHQ-2 Score 0       Have you ever done Advance Care Planning? (For example, a Health Directive, POLST, or a discussion with a medical provider or your loved ones about your wishes): No, advance care planning information given to patient to review.  Patient plans to discuss their wishes with loved ones or provider.      Social History     Tobacco Use     Smoking status: Every Day     Types: Cigars     Smokeless tobacco: Never     Tobacco comments:     5 cigars a day   Substance Use Topics     Alcohol use: Yes     Alcohol/week: 24.0 standard drinks     Comment: Alcoholic Drinks/day: case of beer per week         Alcohol Use 3/9/2023   Prescreen: >3 drinks/day or >7 drinks/week? Yes   AUDIT SCORE  6       Last PSA:   Prostate Specific Antigen Screen   Date Value Ref Range Status   03/03/2022 0.68 0.00 - 4.00 ug/L Final       Reviewed orders with patient. Reviewed health maintenance and updated orders accordingly - Yes  Labs reviewed in [facility]  Current Outpatient Medications   Medication  Sig Dispense Refill     atorvastatin (LIPITOR) 40 MG tablet Take 1 tablet (40 mg) by mouth daily 90 tablet 4     ketoconazole (NIZORAL) 2 % external cream Apply topically daily 60 g 4     metoprolol succinate ER (TOPROL-XL) 200 MG 24 hr tablet Take 1 tablet (200 mg) by mouth daily 90 tablet 4     sildenafil (REVATIO) 20 MG tablet TAKE 1-3 TABLETS by mouth prior TO sex (start WITH lower DOSE). DO not TAKE WITH nitrates. Go TO Emergency Department IF erection lasts greater THAN 4 hours. 30 tablet 3     tamsulosin (FLOMAX) 0.4 MG capsule Take 2 capsules (0.8 mg) by mouth daily 180 capsule 4     terbinafine (LAMISIL) 1 % external cream Apply topically 2 times daily 30 g 1     No Known Allergies    Reviewed and updated as needed this visit by clinical staff   Tobacco  Allergies  Meds   Med Hx  Surg Hx  Fam Hx          Reviewed and updated as needed this visit by Provider                 Past Medical History:   Diagnosis Date     Diverticulosis of large intestine without perforation or abscess without bleeding     1/3/2011, inactive icd-9 diagnosis auto replaced with icd-10, display name retained//mporz     Essential (primary) hypertension     No Comments Provided     Impingement syndrome of right shoulder     5/21/2015     Migraine without status migrainosus, not intractable     No Comments Provided     Nodular prostate with lower urinary tract symptoms     No Comments Provided     Other shoulder lesions, right shoulder     5/21/2015     Other specified postprocedural states     1/6/2016     Right rotator cuff tear     6/12/2015      Past Surgical History:   Procedure Laterality Date     COLONOSCOPY  01/03/2011    Next due-2021  hyperplastic polyp Dr Martinez     COLONOSCOPY N/A 04/21/2021    adnomatous, f/u 3 years     FRACTURE SURGERY Left 2009     Mandibular fracture with subsequent ORIF     OTHER SURGICAL HISTORY Right 01/06/2016       Review of Systems   Constitutional: Negative for chills and fever.   HENT:  "Negative for congestion, ear pain, hearing loss and sore throat.    Eyes: Negative for pain and visual disturbance.   Respiratory: Positive for shortness of breath. Negative for cough.    Cardiovascular: Negative for chest pain, palpitations and peripheral edema.   Gastrointestinal: Negative for abdominal pain, constipation, diarrhea, heartburn, hematochezia and nausea.   Genitourinary: Positive for impotence and urgency. Negative for dysuria, frequency, genital sores, hematuria and penile discharge.   Musculoskeletal: Positive for arthralgias and myalgias. Negative for joint swelling.   Skin: Negative for rash.   Neurological: Negative for dizziness, weakness, headaches and paresthesias.   Psychiatric/Behavioral: Negative for mood changes. The patient is not nervous/anxious.      Feels the shortness of breath is smoking related. Had smoked cigarettes many years ago at 1 ppd, and then cigars for the last 20 years, at more than 4-5 daily.     Last year his triglycerides were high. Started a statin. Also cut way back on alcohol. Now drinks only on vacation.         OBJECTIVE:   /76   Pulse 76   Temp 97.6  F (36.4  C) (Tympanic)   Resp 19   Ht 1.778 m (5' 10\")   Wt 115.7 kg (255 lb)   SpO2 96%   BMI 36.59 kg/m      Physical Exam  GENERAL: healthy, alert and no distress  EYES: Eyes grossly normal to inspection, PERRL and conjunctivae and sclerae normal  HENT: ear canals and TM's normal, nose and mouth without ulcers or lesions  NECK: no adenopathy, no asymmetry, masses, or scars and thyroid normal to palpation  RESP: lungs clear to auscultation - no rales, rhonchi or wheezes  CV: regular rate and rhythm, normal S1 S2, no S3 or S4, no murmur, click or rub, no peripheral edema and peripheral pulses strong  ABDOMEN: soft, nontender, no hepatosplenomegaly, no masses and bowel sounds normal  MS: no gross musculoskeletal defects noted, no edema  SKIN: no suspicious lesions or rashes  NEURO: Normal strength and " tone, mentation intact and speech normal  PSYCH: mentation appears normal, affect normal/bright    Diagnostic Test Results:  Labs reviewed in Epic  Results for orders placed or performed in visit on 03/09/23   Glucose     Status: None   Result Value Ref Range    Glucose 93 70 - 99 mg/dL    Patient Fasting > 8hrs? Unknown    Lipid Profile     Status: Abnormal   Result Value Ref Range    Cholesterol 156 <200 mg/dL    Triglycerides 447 (H) <150 mg/dL    Direct Measure HDL 37 (L) >=40 mg/dL    LDL Cholesterol Calculated      Non HDL Cholesterol 119 <130 mg/dL    Narrative    Cholesterol  Desirable:  <200 mg/dL    Triglycerides  Normal:  Less than 150 mg/dL  Borderline High:  150-199 mg/dL  High:  200-499 mg/dL  Very High:  Greater than or equal to 500 mg/dL    Direct Measure HDL  Female:  Greater than or equal to 50 mg/dL   Male:  Greater than or equal to 40 mg/dL    LDL Cholesterol  Desirable:  <100mg/dL  Above Desirable:  100-129 mg/dL   Borderline High:  130-159 mg/dL   High:  160-189 mg/dL   Very High:  >= 190 mg/dL    Non HDL Cholesterol  Desirable:  130 mg/dL  Above Desirable:  130-159 mg/dL  Borderline High:  160-189 mg/dL  High:  190-219 mg/dL  Very High:  Greater than or equal to 220 mg/dL   Prostate Specific Antigen Screen     Status: Normal   Result Value Ref Range    Prostate Specific Antigen Screen 1.08 0.00 - 4.50 ng/mL    Narrative    This result is obtained using the Roche Elecsys total PSA method on the shaye e601 immunoassay analyzer. Results obtained with different assay methods or kits cannot be used interchangeably.   Extra Tube     Status: None    Narrative    The following orders were created for panel order Extra Tube.  Procedure                               Abnormality         Status                     ---------                               -----------         ------                     Extra Serum Separator Tu...[[id number]]                      Final result                 Please view  "results for these tests on the individual orders.   Extra Serum Separator Tube (SST)     Status: None   Result Value Ref Range    Hold Specimen x          ASSESSMENT/PLAN:       ICD-10-CM    1. Routine general medical examination at a health care facility  Z00.00       2. Hypertriglyceridemia  E78.1 atorvastatin (LIPITOR) 40 MG tablet      3. Essential hypertension  I10 metoprolol succinate ER (TOPROL XL) 200 MG 24 hr tablet      4. Benign prostatic hyperplasia with lower urinary tract symptoms, symptom details unspecified  N40.1 tamsulosin (FLOMAX) 0.4 MG capsule      5. Erectile dysfunction, unspecified erectile dysfunction type  N52.9 sildenafil (REVATIO) 20 MG tablet      6. Nicotine dependence, uncomplicated, unspecified nicotine product type  F17.200 [place] Quit Partner Referral     varenicline (CHANTIX) 0.5 MG tablet     varenicline (CHANTIX) 1 MG tablet      7. Personal history of tobacco use  Z87.891 Prof fee: Shared Decision Making for Lung Cancer Screening     CT Chest Lung Cancer Scrn Low Dose wo      8. Screening for prostate cancer  Z12.5 Prostate Specific Antigen Screen      9. High blood cholesterol  E78.00 Lipid Profile     Glucose        Refilled meds above without changes. Discussed with him ways to lower the trigs, mostly stopping all alcohol.          COUNSELING:   Reviewed preventive health counseling, as reflected in patient instructions       Regular exercise       Healthy diet/nutrition       Prostate cancer screening      BMI:   Estimated body mass index is 36.59 kg/m  as calculated from the following:    Height as of this encounter: 1.778 m (5' 10\").    Weight as of this encounter: 115.7 kg (255 lb).   Weight management plan: Discussed healthy diet and exercise guidelines      He reports that he has been smoking cigars. He has never used smokeless tobacco.  Nicotine/Tobacco Cessation Plan:   Pharmacotherapies : varenicline (Chantix)            Tal Crockett MD  [facility] AND " [facility]  Lung Cancer Screening Shared Decision Making Visit     Watson Price, a 62 year old male, is eligible for lung cancer screening    History   Smoking Status     Every Day     Types: Cigars   Smokeless Tobacco     Never       I have discussed with patient the risks and benefits of screening for lung cancer with low-dose CT.     The risks include:    radiation exposure: one low dose chest CT has as much ionizing radiation as about 15 chest x-rays, or 6 months of background radiation living in [place]      false positives: most findings/nodules are NOT cancer, but some might still require additional diagnostic evaluation, including biopsy    over-diagnosis: some slow growing cancers that might never have been clinically significant will be detected and treated unnecessarily     The benefit of early detection of lung cancer is contingent upon adherence to annual screening or more frequent follow up if indicated.     Furthermore, to benefit from screening, Watson must be willing and able to undergo diagnostic procedures, if indicated. Although no specific guide is available for determining severity of comorbidities, it is reasonable to withhold screening in patients who have greater mortality risk from other diseases.     We did discuss that the best way to prevent lung cancer is to not smoke.    Some patients may value a numeric estimation of lung cancer risk when evaluating if lung cancer screening is right for them, here is one calculator:    ShouldIScreen

## 2023-03-09 NOTE — PATIENT INSTRUCTIONS
Preventive Health Recommendations  Male Ages 50 - 64    Yearly exam:             See your health care provider every year in order to  o   Review health changes.   o   Discuss preventive care.    o   Review your medicines if your doctor has prescribed any.     Have a cholesterol test every 5 years, or more frequently if you are at risk for high cholesterol/heart disease.     Have a diabetes test (fasting glucose) every three years. If you are at risk for diabetes, you should have this test more often.     Have a colonoscopy at age 50, or have a yearly FIT test (stool test). These exams will check for colon cancer.      Talk with your health care provider about whether or not a prostate cancer screening test (PSA) is right for you.    You should be tested each year for STDs (sexually transmitted diseases), if you re at risk.     Shots: Get a flu shot each year. Get a tetanus shot every 10 years.     Nutrition:    Eat at least 5 servings of fruits and vegetables daily.     Eat whole-grain bread, whole-wheat pasta and brown rice instead of white grains and rice.     Get adequate Calcium and Vitamin D.     Lifestyle    Exercise for at least 150 minutes a week (30 minutes a day, 5 days a week). This will help you control your weight and prevent disease.     Limit alcohol to one drink per day.     No smoking.     Wear sunscreen to prevent skin cancer.     See your dentist every six months for an exam and cleaning.     See your eye doctor every 1 to 2 years.    Lung Cancer Screening   Frequently Asked Questions  If you are at high-risk for lung cancer, getting screened with low-dose computed tomography (LDCT) every year can help save your life. This handout offers answers to some of the most common questions about lung cancer screening. If you have other questions, please call [phone] ([phone]).     What is it?  Lung cancer screening uses special X-ray technology to create an image of your lung tissue.  The exam is quick and easy and takes less than 10 seconds. We don t give you any medicine or use any needles. You can eat before and after the exam. You don t need to change your clothes as long as the clothing on your chest doesn t contain metal. But, you do need to be able to hold your breath for at least 6 seconds during the exam.    What is the goal of lung cancer screening?  The goal of lung cancer screening is to save lives. Many times, lung cancer is not found until a person starts having physical symptoms. Lung cancer screening can help detect lung cancer in the earliest stages when it may be easier to treat.    Who should be screened for lung cancer?  We suggest lung cancer screening for anyone who is at high-risk for lung cancer. You are in the high-risk group if you:      are between the ages of 55 and 79, and    have smoked at least 1 pack of cigarettes a day for 20 or more years, and    still smoke or have quit within the past 15 years.    However, if you have a new cough or shortness of breath, you should talk to your doctor before being screened.    Why does it matter if I have symptoms?  Certain symptoms can be a sign that you have a condition in your lungs that should be checked and treated by your doctor. These symptoms include fever, chest pain, a new or changing cough, shortness of breath that you have never felt before, coughing up blood or unexplained weight loss. Having any of these symptoms can greatly affect the results of lung cancer screening.       Should all smokers get an LDCT lung cancer screening exam?  It depends. Lung cancer screening is for a very specific group of men and women who have a history of heavy smoking over a long period of time (see  Who should be screened for lung cancer  above).  I am in the high-risk group, but have been diagnosed with cancer in the past. Is LDCT lung cancer screening right for me?  In some cases, you should not have LDCT lung screening, such as  when your doctor is already following your cancer with CT scan studies. Your doctor will help you decide if LDCT lung screening is right for you.  Do I need to have a screening exam every year?  Yes. If you are in the high-risk group described earlier, you should get an LDCT lung cancer screening exam every year until you are 79, or are no longer willing or able to undergo screening and possible procedures to diagnose and treat lung cancer.  How effective is LDCT at preventing death from lung cancer?  Studies have shown that LDCT lung cancer screening can lower the risk of death from lung cancer by 20 percent in people who are at high-risk.  What are the risks?  There are some risks and limitations of LDCT lung cancer screening. We want to make sure you understand the risks and benefits, so please let us know if you have any questions. Your doctor may want to talk with you more about these risks.    Radiation exposure: As with any exam that uses radiation, there is a very small increased risk of cancer. The amount of radiation in LDCT is small--about the same amount a person would get from a mammogram. Your doctor orders the exam when he or she feels the potential benefits outweigh the risks.    False negatives: No test is perfect, including LDCT. It is possible that you may have a medical condition, including lung cancer, that is not found during your exam. This is called a false negative result.    False positives and more testing: LDCT very often finds something in the lung that could be cancer, but in fact is not. This is called a false positive result. False positive tests often cause anxiety. To make sure these findings are not cancer, you may need to have more tests. These tests will be done only if you give us permission. Sometimes patients need a treatment that can have side effects, such as a biopsy. For more information on false positives, see  What can I expect from the results?     Findings not related  to lung cancer: Your LDCT exam also takes pictures of areas of your body next to your lungs. In a very small number of cases, the CT scan will show an abnormal finding in one of these areas, such as your kidneys, adrenal glands, liver or thyroid. This finding may not be serious, but you may need more tests. Your doctor can help you decide what other tests you may need, if any.  What can I expect from the results?  About 1 out of 4 LDCT exams will find something that may need more tests. Most of the time, these findings are lung nodules. Lung nodules are very small collections of tissue in the lung. These nodules are very common, and the vast majority--more than 97 percent--are not cancer (benign). Most are normal lymph nodes or small areas of scarring from past infections.  But, if a small lung nodule is found to be cancer, the cancer can be cured more than 90 percent of the time. To know if the nodule is cancer, we may need to get more images before your next yearly screening exam. If the nodule has suspicious features (for example, it is large, has an odd shape or grows over time), we will refer you to a specialist for further testing.  Will my doctor also get the results?  Yes. Your doctor will get a copy of your results.  Is it okay to keep smoking now that there s a cancer screening exam?  No. Tobacco is one of the strongest cancer-causing agents. It causes not only lung cancer, but other cancers and cardiovascular (heart) diseases as well. The damage caused by smoking builds over time. This means that the longer you smoke, the higher your risk of disease. While it is never too late to quit, the sooner you quit, the better.  Where can I find help to quit smoking?  The best way to prevent lung cancer is to stop smoking. If you have already quit smoking, congratulations and keep it up! For help on quitting smoking, please call [facility] at 5-284-QUIT-NOW (1-307.434.7999) or the [employer] at  1-832.550.8375 to find local resources near you.  One-on-one health coaching:  If you d prefer to work individually with a health care provider on tobacco cessation, we offer:      Medication Therapy Management:  Our specially trained pharmacists work closely with you and your doctor to help you quit smoking.  Call [phone] or [phone] (toll free).

## 2023-03-09 NOTE — NURSING NOTE
"Chief Complaint   Patient presents with     Physical       Initial /76   Pulse 76   Temp 97.6  F (36.4  C) (Tympanic)   Resp 19   Ht 1.778 m (5' 10\")   Wt 115.7 kg (255 lb)   SpO2 96%   BMI 36.59 kg/m   Estimated body mass index is 36.59 kg/m  as calculated from the following:    Height as of this encounter: 1.778 m (5' 10\").    Weight as of this encounter: 115.7 kg (255 lb).  Medication Reconciliation: complete    FOOD SECURITY SCREENING QUESTIONS  Hunger Vital Signs:  Within the past 12 months we worried whether our food would run out before we got money to buy more. Never  Within the past 12 months the food we bought just didn't last and we didn't have money to get more. Never  Eneida Romero LPN 3/9/2023 1:48 PM        "

## 2023-03-21 DIAGNOSIS — N52.9 ERECTILE DYSFUNCTION, UNSPECIFIED ERECTILE DYSFUNCTION TYPE: ICD-10-CM

## 2023-03-21 RX ORDER — SILDENAFIL CITRATE 20 MG/1
TABLET ORAL
Qty: 30 TABLET | Refills: 3 | Status: SHIPPED | OUTPATIENT
Start: 2023-03-21

## 2023-03-23 ENCOUNTER — HOSPITAL ENCOUNTER (OUTPATIENT)
Dept: CT IMAGING | Facility: OTHER | Age: 63
Discharge: HOME | End: 2023-03-23
Attending: FAMILY MEDICINE | Admitting: FAMILY MEDICINE
Payer: COMMERCIAL

## 2023-03-23 DIAGNOSIS — Z87.891 PERSONAL HISTORY OF TOBACCO USE: ICD-10-CM

## 2023-03-23 PROCEDURE — 71271 CT THORAX LUNG CANCER SCR C-: CPT

## 2023-11-07 ENCOUNTER — ALLIED HEALTH/NURSE VISIT (OUTPATIENT)
Dept: FAMILY MEDICINE | Facility: OTHER | Age: 63
End: 2023-11-07
Attending: FAMILY MEDICINE
Payer: COMMERCIAL

## 2023-11-07 DIAGNOSIS — Z23 NEED FOR PROPHYLACTIC VACCINATION AND INOCULATION AGAINST INFLUENZA: Primary | ICD-10-CM

## 2023-11-07 DIAGNOSIS — Z23 HIGH PRIORITY FOR 2019-NCOV VACCINE: ICD-10-CM

## 2023-11-07 PROCEDURE — 91320 SARSCV2 VAC 30MCG TRS-SUC IM: CPT

## 2023-11-07 PROCEDURE — 90682 RIV4 VACC RECOMBINANT DNA IM: CPT

## 2023-11-07 NOTE — PROGRESS NOTES
Immunization Documentation  Verified patient's first and last name, and . Stated reason for visit today is to receive Flu, covid vaccines. Denied any concerns with previous immunizations. Allergies reviewed. VIS handout(s) reviewed and given to take home. Vaccines prepared and administered per standing order. Administration documented in IMMUNIZATIONS (see flowsheet and order for further information). .     Tammi Clement RN ....................  2023   2:22 PM

## 2024-03-14 ENCOUNTER — OFFICE VISIT (OUTPATIENT)
Dept: FAMILY MEDICINE | Facility: OTHER | Age: 64
End: 2024-03-14
Attending: FAMILY MEDICINE
Payer: COMMERCIAL

## 2024-03-14 VITALS
OXYGEN SATURATION: 95 % | RESPIRATION RATE: 18 BRPM | WEIGHT: 263 LBS | HEART RATE: 92 BPM | SYSTOLIC BLOOD PRESSURE: 112 MMHG | HEIGHT: 71 IN | BODY MASS INDEX: 36.82 KG/M2 | DIASTOLIC BLOOD PRESSURE: 80 MMHG | TEMPERATURE: 97.9 F

## 2024-03-14 DIAGNOSIS — N40.1 BENIGN PROSTATIC HYPERPLASIA WITH LOWER URINARY TRACT SYMPTOMS, SYMPTOM DETAILS UNSPECIFIED: ICD-10-CM

## 2024-03-14 DIAGNOSIS — Z11.59 NEED FOR HEPATITIS C SCREENING TEST: ICD-10-CM

## 2024-03-14 DIAGNOSIS — E78.1 HYPERTRIGLYCERIDEMIA: ICD-10-CM

## 2024-03-14 DIAGNOSIS — E66.01 MORBID OBESITY (H): ICD-10-CM

## 2024-03-14 DIAGNOSIS — Z00.00 ROUTINE GENERAL MEDICAL EXAMINATION AT A HEALTH CARE FACILITY: Primary | ICD-10-CM

## 2024-03-14 DIAGNOSIS — Z12.5 SCREENING FOR PROSTATE CANCER: ICD-10-CM

## 2024-03-14 DIAGNOSIS — F10.20 UNCOMPLICATED ALCOHOL DEPENDENCE (H): ICD-10-CM

## 2024-03-14 DIAGNOSIS — Z11.4 SCREENING FOR HIV (HUMAN IMMUNODEFICIENCY VIRUS): ICD-10-CM

## 2024-03-14 DIAGNOSIS — I10 ESSENTIAL HYPERTENSION: ICD-10-CM

## 2024-03-14 DIAGNOSIS — Z87.891 PERSONAL HISTORY OF TOBACCO USE: ICD-10-CM

## 2024-03-14 LAB
ALBUMIN SERPL BCG-MCNC: 4.6 G/DL (ref 3.5–5.2)
ALP SERPL-CCNC: 76 U/L (ref 40–150)
ALT SERPL W P-5'-P-CCNC: 57 U/L (ref 0–70)
ANION GAP SERPL CALCULATED.3IONS-SCNC: 9 MMOL/L (ref 7–15)
AST SERPL W P-5'-P-CCNC: 28 U/L (ref 0–45)
BILIRUB SERPL-MCNC: 0.4 MG/DL
BUN SERPL-MCNC: 16.5 MG/DL (ref 8–23)
CALCIUM SERPL-MCNC: 10.1 MG/DL (ref 8.8–10.2)
CHLORIDE SERPL-SCNC: 100 MMOL/L (ref 98–107)
CHOLEST SERPL-MCNC: 171 MG/DL
CREAT SERPL-MCNC: 0.98 MG/DL (ref 0.67–1.17)
DEPRECATED HCO3 PLAS-SCNC: 29 MMOL/L (ref 22–29)
EGFRCR SERPLBLD CKD-EPI 2021: 86 ML/MIN/1.73M2
FASTING STATUS PATIENT QL REPORTED: NO
GLUCOSE SERPL-MCNC: 91 MG/DL (ref 70–99)
HDLC SERPL-MCNC: 37 MG/DL
LDLC SERPL CALC-MCNC: ABNORMAL MG/DL
NONHDLC SERPL-MCNC: 134 MG/DL
POTASSIUM SERPL-SCNC: 4.5 MMOL/L (ref 3.4–5.3)
PROT SERPL-MCNC: 7.4 G/DL (ref 6.4–8.3)
PSA SERPL DL<=0.01 NG/ML-MCNC: 0.94 NG/ML (ref 0–4.5)
SODIUM SERPL-SCNC: 138 MMOL/L (ref 135–145)
TRIGL SERPL-MCNC: 671 MG/DL

## 2024-03-14 PROCEDURE — 87389 HIV-1 AG W/HIV-1&-2 AB AG IA: CPT | Mod: ZL | Performed by: FAMILY MEDICINE

## 2024-03-14 PROCEDURE — 80053 COMPREHEN METABOLIC PANEL: CPT | Mod: ZL | Performed by: FAMILY MEDICINE

## 2024-03-14 PROCEDURE — 99396 PREV VISIT EST AGE 40-64: CPT | Performed by: FAMILY MEDICINE

## 2024-03-14 PROCEDURE — 86803 HEPATITIS C AB TEST: CPT | Mod: ZL | Performed by: FAMILY MEDICINE

## 2024-03-14 PROCEDURE — 84153 ASSAY OF PSA TOTAL: CPT | Mod: ZL | Performed by: FAMILY MEDICINE

## 2024-03-14 PROCEDURE — G0296 VISIT TO DETERM LDCT ELIG: HCPCS | Performed by: FAMILY MEDICINE

## 2024-03-14 PROCEDURE — 36415 COLL VENOUS BLD VENIPUNCTURE: CPT | Mod: ZL | Performed by: FAMILY MEDICINE

## 2024-03-14 PROCEDURE — 80061 LIPID PANEL: CPT | Mod: ZL | Performed by: FAMILY MEDICINE

## 2024-03-14 RX ORDER — ATORVASTATIN CALCIUM 40 MG/1
40 TABLET, FILM COATED ORAL DAILY
Qty: 90 TABLET | Refills: 4 | Status: SHIPPED | OUTPATIENT
Start: 2024-03-14

## 2024-03-14 RX ORDER — TAMSULOSIN HYDROCHLORIDE 0.4 MG/1
0.8 CAPSULE ORAL DAILY
Qty: 180 CAPSULE | Refills: 4 | Status: SHIPPED | OUTPATIENT
Start: 2024-03-14

## 2024-03-14 RX ORDER — METOPROLOL SUCCINATE 200 MG/1
200 TABLET, EXTENDED RELEASE ORAL DAILY
Qty: 90 TABLET | Refills: 4 | Status: SHIPPED | OUTPATIENT
Start: 2024-03-14

## 2024-03-14 SDOH — HEALTH STABILITY: PHYSICAL HEALTH: ON AVERAGE, HOW MANY DAYS PER WEEK DO YOU ENGAGE IN MODERATE TO STRENUOUS EXERCISE (LIKE A BRISK WALK)?: 1 DAY

## 2024-03-14 ASSESSMENT — PAIN SCALES - GENERAL: PAINLEVEL: NO PAIN (0)

## 2024-03-14 ASSESSMENT — SOCIAL DETERMINANTS OF HEALTH (SDOH): HOW OFTEN DO YOU GET TOGETHER WITH FRIENDS OR RELATIVES?: TWICE A WEEK

## 2024-03-14 NOTE — PROGRESS NOTES
"Preventive Care Visit  [facility] AND hospitals  Tal Crockett MD, Family Medicine  Mar 14, 2024      Assessment & Plan     (Z00.00) Routine general medical examination at a health care facility  (primary encounter diagnosis)  Comment:    Plan:      (E78.1) Hypertriglyceridemia  Comment: I again advised alcohol cessation and weight loss  Plan: Lipid Profile, atorvastatin (LIPITOR) 40 MG         tablet             (I10) Essential hypertension  Comment: stale  Plan: metoprolol succinate ER (TOPROL XL) 200 MG 24         hr tablet        Refilled without change    (N40.1) Benign prostatic hyperplasia with lower urinary tract symptoms, symptom details unspecified  Comment:    Plan: tamsulosin (FLOMAX) 0.4 MG capsule        Refilled without change    (Z11.4) Screening for HIV (human immunodeficiency virus)  Comment:    Plan: HIV Screening             (Z11.59) Need for hepatitis C screening test  Comment:    Plan: Hepatitis C Screen Reflex to HCV RNA Quant and         Genotype             (Z87.891) Personal history of tobacco use  Comment:    Plan: Prof fee: Shared Decision Making for Lung         Cancer Screening, CT Chest Lung Cancer Scrn Low        Dose wo             (F10.20) Uncomplicated alcohol dependence (H)  Comment: is not actively ready to stop, but has cut back considerably.   Plan: Comprehensive Metabolic Panel             (E66.01) Morbid obesity (H)  Comment: advised weight loss as a treatment for the hypertension and hypertriglyceridemia   Plan:      (Z12.5) Screening for prostate cancer  Comment:    Plan: PSA Screen GH                       Nicotine/Tobacco Cessation  He reports that he has been smoking cigars. He has never used smokeless tobacco.  Nicotine/Tobacco Cessation Plan  Information offered: Patient not interested at this time      BMI  Estimated body mass index is 37.2 kg/m  as calculated from the following:    Height as of this encounter: 1.791 m (5' 10.5\").    Weight as of this " encounter: 119.3 kg (263 lb).   Weight management plan: Discussed healthy diet and exercise guidelines    Counseling  Appropriate preventive services were discussed with this patient, including applicable screening as appropriate for fall prevention, nutrition, physical activity, Tobacco-use cessation, weight loss and cognition.  Checklist reviewing preventive services available has been given to the patient.  Reviewed patient's diet, addressing concerns and/or questions.   He is at risk for lack of exercise and has been provided with information to increase physical activity for the benefit of his well-being.   The patient was instructed to see the dentist every 6 months.   The patient reports drinking more than one alcoholic drink per day and sometimes engages in binge or excessive drinking. The patient was counseled and given information about possible harmful effects of excessive alcohol intake as well as where to get help for alcohol problems.         Return in about 53 weeks (around 3/20/2025) for Annual Wellness Visit.    Lou Chaudhari is a 63 year old, presenting for the following:  Physical        3/14/2024     1:42 PM   Additional Questions   Roomed by AILYN Monique   Accompanied by Spouse         3/14/2024     1:42 PM   Patient Reported Additional Medications   Patient reports taking the following new medications N/A        Health Care Directive  Patient does not have a Health Care Directive or Living Will: Discussed advance care planning with patient; however, patient declined at this time.    HPI  Notes some dyspnea on exertion now. Will rest for a few minutes and then it resolves. Feels it is form his smoking. Nearly ready to stop.     He had stopped drinking but then resumed in the last year. Is using at least 20 per week.             3/14/2024   General Health   How would you rate your overall physical health? Good   Feel stress (tense, anxious, or unable to sleep) Not at all         3/14/2024    Nutrition   Three or more servings of calcium each day? Yes   Diet: Regular (no restrictions)   How many servings of fruit and vegetables per day? (!) 2-3   How many sweetened beverages each day? 0-1         3/14/2024   Exercise   Days per week of moderate/strenous exercise 1 day   (!) EXERCISE CONCERN      3/14/2024   Social Factors   Frequency of gathering with friends or relatives Twice a week   Worry food won't last until get money to buy more No   Food not last or not have enough money for food? No   Do you have housing?  Yes   Are you worried about losing your housing? No   Lack of transportation? No   Unable to get utilities (heat,electricity)? No         3/3/2022   Fall Risk   Fallen 2 or more times in the past year? No          3/14/2024   Dental   Dentist two times every year? (!) NO         3/14/2024   TB Screening   Were you born outside of [place]?  No         Today's PHQ-2 Score:       3/14/2024     1:38 PM   PHQ-2 ( 1999 [employer])   Q1: Little interest or pleasure in doing things 0   Q2: Feeling down, depressed or hopeless 0   PHQ-2 Score 0   Q1: Little interest or pleasure in doing things Not at all   Q2: Feeling down, depressed or hopeless Not at all   PHQ-2 Score 0           3/14/2024   Substance Use   If I could quit smoking, I would Neutral   I want to quit somking, worry about health affects Neutral   Willing to make a plan to quit smoking Neutral   Willing to cut down before quitting Neutral   Alcohol more than 3/day or more than 7/wk Yes   How often do you have a drink containing alcohol 2 to 3 times a week   How many alcohol drinks on typical day 3 or 4   How often do you have 5+ drinks at one occasion Less than monthly   Audit 2/3 Score 2   How often not able to stop drinking once started Never   How often failed to do what normally expected Never   How often needed first drink in am after a heavy drinking session Never   How often feeling of guilt or remorse after drinking Never   How often  unable to remember what happened the night before Never   Have you or someone else been injured because of your drinking No   Has anyone been concerned or suggested you cut down on drinking No   TOTAL SCORE - AUDIT 5   Do you use any other substances recreationally? (!) DECLINE     Social History     Tobacco Use    Smoking status: Every Day     Types: Cigars    Smokeless tobacco: Never    Tobacco comments:     5 cigars a day   Vaping Use    Vaping Use: Never used   Substance Use Topics    Alcohol use: Yes     Alcohol/week: 24.0 standard drinks of alcohol     Comment: Alcoholic Drinks/day: case of beer per week    Drug use: Never             3/14/2024   One time HIV Screening   Previous HIV test? No         3/14/2024   STI Screening   New sexual partner(s) since last STI/HIV test? No   Last PSA:   Prostate Specific Antigen Screen   Date Value Ref Range Status   03/09/2023 1.08 0.00 - 4.50 ng/mL Final   03/03/2022 0.68 0.00 - 4.00 ug/L Final     ASCVD Risk   The 10-year ASCVD risk score (Ming GRIGGS, et al., 2019) is: 15.5%    Values used to calculate the score:      Age: 63 years      Sex: Male      Is Non- : No      Diabetic: No      Tobacco smoker: Yes      Systolic Blood Pressure: 112 mmHg      Is BP treated: Yes      HDL Cholesterol: 37 mg/dL      Total Cholesterol: 156 mg/dL           Reviewed and updated as needed this visit by Provider                    Past Medical History:   Diagnosis Date    Diverticulosis of large intestine without perforation or abscess without bleeding     1/3/2011, inactive icd-9 diagnosis auto replaced with icd-10, display name retained//mporz    Essential (primary) hypertension     No Comments Provided    Impingement syndrome of right shoulder     5/21/2015    Migraine without status migrainosus, not intractable     No Comments Provided    Nodular prostate with lower urinary tract symptoms     No Comments Provided    Other shoulder lesions, right  "shoulder     5/21/2015    Other specified postprocedural states     1/6/2016    Right rotator cuff tear     6/12/2015     Past Surgical History:   Procedure Laterality Date    COLONOSCOPY  01/03/2011    Next due-2021  hyperplastic polyp Dr Martinez    COLONOSCOPY N/A 04/21/2021    adnomatous, f/u 3 years    FRACTURE SURGERY Left 2009     Mandibular fracture with subsequent ORIF    OTHER SURGICAL HISTORY Right 01/06/2016     Current Outpatient Medications   Medication Sig Dispense Refill    atorvastatin (LIPITOR) 40 MG tablet Take 1 tablet (40 mg) by mouth daily 90 tablet 4    ketoconazole (NIZORAL) 2 % external cream Apply topically daily 60 g 4    metoprolol succinate ER (TOPROL XL) 200 MG 24 hr tablet Take 1 tablet (200 mg) by mouth daily 90 tablet 4    tamsulosin (FLOMAX) 0.4 MG capsule Take 2 capsules (0.8 mg) by mouth daily 180 capsule 4    terbinafine (LAMISIL) 1 % external cream Apply topically 2 times daily 30 g 1    sildenafil (REVATIO) 20 MG tablet TAKE 1 TO 3 TABLETS BY MOUTH PRIOR TO SEX. DO NOT TAKE WITH NITRATES. GO TO ER IF ERECTION LASTS GREATER THAN 4 HOURS. (Patient not taking: Reported on 3/14/2024) 30 tablet 3    varenicline (CHANTIX) 0.5 MG tablet Take 0.5 mg (1 tablet) once a day for 3 days, THEN 0.5 mg (1 tablet) twice daily for 4 days, THEN 1.0 mg (2 tablets) twice daily (Patient not taking: Reported on 3/14/2024) 95 tablet 0    varenicline (CHANTIX) 1 MG tablet Take 1 tablet (1 mg) by mouth 2 times daily (Patient not taking: Reported on 3/14/2024) 180 tablet 0     No Known Allergies         Objective    Exam  /80   Pulse 92   Temp 97.9  F (36.6  C) (Tympanic)   Resp 18   Ht 1.791 m (5' 10.5\")   Wt 119.3 kg (263 lb)   SpO2 95%   BMI 37.20 kg/m     Estimated body mass index is 37.2 kg/m  as calculated from the following:    Height as of this encounter: 1.791 m (5' 10.5\").    Weight as of this encounter: 119.3 kg (263 lb).    Physical Exam  GENERAL: alert and no distress  EYES: Eyes " grossly normal to inspection, PERRL and conjunctivae and sclerae normal  HENT: ear canals and TM's normal, nose and mouth without ulcers or lesions  NECK: no adenopathy, no asymmetry, masses, or scars  RESP: lungs clear to auscultation - no rales, rhonchi or wheezes  CV: regular rate and rhythm, normal S1 S2, no S3 or S4, no murmur, click or rub, no peripheral edema  ABDOMEN: soft, nontender, no hepatosplenomegaly, no masses and bowel sounds normal  MS: no gross musculoskeletal defects noted, no edema  SKIN: no suspicious lesions or rashes  NEURO: Normal strength and tone, mentation intact and speech normal  PSYCH: mentation appears normal, affect normal/bright    Results for orders placed or performed in visit on 03/14/24   Lipid Profile     Status: Abnormal   Result Value Ref Range    Cholesterol 171 <200 mg/dL    Triglycerides 671 (H) <150 mg/dL    Direct Measure HDL 37 (L) >=40 mg/dL    LDL Cholesterol Calculated      Non HDL Cholesterol 134 (H) <130 mg/dL    Patient Fasting > 8hrs? No     Narrative    Cholesterol  Desirable:  <200 mg/dL    Triglycerides  Normal:  Less than 150 mg/dL  Borderline High:  150-199 mg/dL  High:  200-499 mg/dL  Very High:  Greater than or equal to 500 mg/dL    Direct Measure HDL  Female:  Greater than or equal to 50 mg/dL   Male:  Greater than or equal to 40 mg/dL    LDL Cholesterol  Desirable:  <100mg/dL  Above Desirable:  100-129 mg/dL   Borderline High:  130-159 mg/dL   High:  160-189 mg/dL   Very High:  >= 190 mg/dL    Non HDL Cholesterol  Desirable:  130 mg/dL  Above Desirable:  130-159 mg/dL  Borderline High:  160-189 mg/dL  High:  190-219 mg/dL  Very High:  Greater than or equal to 220 mg/dL   PSA Screen GH     Status: Normal   Result Value Ref Range    Prostate Specific Antigen Screen 0.94 0.00 - 4.50 ng/mL    Narrative    This result is obtained using the Roche Elecsys total PSA method on the shaye [id number] immunoassay analyzer. Results obtained with different assay methods or  kits cannot be used interchangeably.   Comprehensive Metabolic Panel     Status: Normal   Result Value Ref Range    Sodium 138 135 - 145 mmol/L    Potassium 4.5 3.4 - 5.3 mmol/L    Carbon Dioxide (CO2) 29 22 - 29 mmol/L    Anion Gap 9 7 - 15 mmol/L    Urea Nitrogen 16.5 8.0 - 23.0 mg/dL    Creatinine 0.98 0.67 - 1.17 mg/dL    GFR Estimate 86 >60 mL/min/1.73m2    Calcium 10.1 8.8 - 10.2 mg/dL    Chloride 100 98 - 107 mmol/L    Glucose 91 70 - 99 mg/dL    Alkaline Phosphatase 76 40 - 150 U/L    AST 28 0 - 45 U/L    ALT 57 0 - 70 U/L    Protein Total 7.4 6.4 - 8.3 g/dL    Albumin 4.6 3.5 - 5.2 g/dL    Bilirubin Total 0.4 <=1.2 mg/dL         Signed Electronically by: Tal Crockett MD

## 2024-03-14 NOTE — NURSING NOTE
"Chief Complaint   Patient presents with    Physical       Initial /80   Pulse 92   Temp 97.9  F (36.6  C) (Tympanic)   Resp 18   Ht 1.791 m (5' 10.5\")   Wt 119.3 kg (263 lb)   SpO2 95%   BMI 37.20 kg/m   Estimated body mass index is 37.2 kg/m  as calculated from the following:    Height as of this encounter: 1.791 m (5' 10.5\").    Weight as of this encounter: 119.3 kg (263 lb).  Medication Reconciliation: complete          "

## 2024-03-14 NOTE — PROGRESS NOTES
Lung Cancer Screening Shared Decision Making Visit     Watson Price, a 63 year old male, is eligible for lung cancer screening    History   Smoking Status     Every Day     Types: Cigars   Smokeless Tobacco     Never       I have discussed with patient the risks and benefits of screening for lung cancer with low-dose CT.     The risks include:    radiation exposure: one low dose chest CT has as much ionizing radiation as about 15 chest x-rays, or 6 months of background radiation living in [place]      false positives: most findings/nodules are NOT cancer, but some might still require additional diagnostic evaluation, including biopsy    over-diagnosis: some slow growing cancers that might never have been clinically significant will be detected and treated unnecessarily     The benefit of early detection of lung cancer is contingent upon adherence to annual screening or more frequent follow up if indicated.     Furthermore, to benefit from screening, Watson must be willing and able to undergo diagnostic procedures, if indicated. Although no specific guide is available for determining severity of comorbidities, it is reasonable to withhold screening in patients who have greater mortality risk from other diseases.     We did discuss that the best way to prevent lung cancer is to not smoke.    Some patients may value a numeric estimation of lung cancer risk when evaluating if lung cancer screening is right for them, here is one calculator:    ShouldIScreen

## 2024-03-14 NOTE — PATIENT INSTRUCTIONS
Preventive Care Advice   This is general advice given by our system to help you stay healthy. However, your care team may have specific advice just for you. Please talk to your care team about your preventive care needs.  Nutrition  Eat 5 or more servings of fruits and vegetables each day.  Try wheat bread, brown rice and whole grain pasta (instead of white bread, rice, and pasta).  Get enough calcium and vitamin D. Check the label on foods and aim for 100% of the RDA (recommended daily allowance).  Lifestyle  Exercise at least 150 minutes each week   (30 minutes a day, 5 days a week).  Do muscle strengthening activities 2 days a week. These help control your weight and prevent disease.  No smoking.  Wear sunscreen to prevent skin cancer.  Have a dental exam and cleaning every 6 months.  Yearly exams  See your health care team every year to talk about:  Any changes in your health.  Any medicines your care team has prescribed.  Preventive care, family planning, and ways to prevent chronic diseases.  Shots (vaccines)   HPV shots (up to age 26), if you've never had them before.  Hepatitis B shots (up to age 59), if you've never had them before.  COVID-19 shot: Get this shot when it's due.  Flu shot: Get a flu shot every year.  Tetanus shot: Get a tetanus shot every 10 years.  Pneumococcal, hepatitis A, and RSV shots: Ask your care team if you need these based on your risk.  Shingles shot (for age 50 and up).  General health tests  Diabetes screening:  Starting at age 35, Get screened for diabetes at least every 3 years.  If you are younger than age 35, ask your care team if you should be screened for diabetes.  Cholesterol test: At age 39, start having a cholesterol test every 5 years, or more often if advised.  Bone density scan (DEXA): At age 50, ask your care team if you should have this scan for osteoporosis (brittle bones).  Hepatitis C: Get tested at least once in your life.  STIs (sexually transmitted  infections)  Before age 24: Ask your care team if you should be screened for STIs.  After age 24: Get screened for STIs if you're at risk. You are at risk for STIs (including HIV) if:  You are sexually active with more than one person.  You don't use condoms every time.  You or a partner was diagnosed with a sexually transmitted infection.  If you are at risk for HIV, ask about PrEP medicine to prevent HIV.  Get tested for HIV at least once in your life, whether you are at risk for HIV or not.  Cancer screening tests  Cervical cancer screening: If you have a cervix, begin getting regular cervical cancer screening tests at age 21. Most people who have regular screenings with normal results can stop after age 65. Talk about this with your provider.  Breast cancer scan (mammogram): If you've ever had breasts, begin having regular mammograms starting at age 40. This is a scan to check for breast cancer.  Colon cancer screening: It is important to start screening for colon cancer at age 45.  Have a colonoscopy test every 10 years (or more often if you're at risk) Or, ask your provider about stool tests like a FIT test every year or Cologuard test every 3 years.  To learn more about your testing options, visit: https://www.[employer]/496761.pdf.  For help making a decision, visit: https://bit.ly/xn73406.  Prostate cancer screening test: If you have a prostate and are age 55 to 69, ask your provider if you would benefit from a yearly prostate cancer screening test.  Lung cancer screening: If you are a current or former smoker age 50 to 80, ask your care team if ongoing lung cancer screenings are right for you.  For informational purposes only. Not to replace the advice of your health care provider. Copyright   2023 [employer]. All rights reserved. Clinically reviewed by the [facility] Transitions Program. [employer] 765600 - REV 01/24.    Learning About Alcohol Use Disorder  What is alcohol use  disorder?  Alcohol use disorder means that a person drinks alcohol even though it causes harm to themselves or others. It can range from mild to severe. The more symptoms of this disorder you have, the more severe it may be. People who have it may find it hard to control their use of alcohol.  People who have this disorder may argue with others about how much they're drinking. Their job may be affected because of drinking. They may drink when it's dangerous or illegal, such as when they drive. They also may have a strong need, or craving, to drink. They may feel like they must drink just to get by. Their drinking may increase their risk of getting hurt or being in a car crash.  Over time, drinking too much alcohol may cause health problems. These may include high blood pressure, liver problems, or problems with digestion.  What are the symptoms?  Maybe you've wondered about your alcohol habits or how to tell if your drinking is becoming a problem.  Here are some of the symptoms of alcohol use disorder. You may have it if you have two or more of the following symptoms:  You drink larger amounts of alcohol than you ever meant to. Or you've been drinking for a longer time than you ever meant to.  You can't cut down or control your use. Or you constantly wish you could cut down.  You spend a lot of time getting or drinking alcohol or recovering from its effects.  You have strong cravings for alcohol.  You can no longer do your main jobs at work, at school, or at home.  You keep drinking alcohol, even though your use hurts your relationships.  You have stopped doing important activities because of your alcohol use.  You drink alcohol in situations where doing so is dangerous.  You keep drinking alcohol even though you know it's causing health problems.  You need more and more alcohol to get the same effect, or you get less effect from the same amount over time. This is called tolerance.  You have uncomfortable symptoms  "when you stop drinking alcohol or use less. This is called withdrawal.  Alcohol use disorder can range from mild to severe. The more symptoms you have, the more severe the disorder may be.  You might not realize that your drinking is a problem. You might not drink large amounts when you drink. Or you might go for days or weeks between drinking episodes. But even if you don't drink very often, your drinking could still be harmful and put you at risk.  How is alcohol use disorder treated?  Getting help is up to you. But you don't have to do it alone. There are many people and kinds of treatments that can help.  Treatment for alcohol use disorder can include:  Group therapy, one or more types of counseling, and alcohol education.  Medicines that help to:  Reduce withdrawal symptoms and help you safely stop drinking.  Reduce cravings for alcohol.  Support groups. These groups include Alcoholics Anonymous and [employer] (Self-Management and Recovery Training).  Some people are able to stop or cut back on drinking with help from a counselor. People who have moderate to severe alcohol use disorder may need medical treatment. They may need to stay in a hospital or treatment center.  You may have a treatment team to help you. This team may include a psychologist or psychiatrist, counselors, doctors, social workers, nurses, and a . A  helps plan and manage your treatment.  Follow-up care is a key part of your treatment and safety. Be sure to make and go to all appointments, and call your doctor if you are having problems. It's also a good idea to know your test results and keep a list of the medicines you take.  Where can you learn more?  Go to https://www.[employer].net/patiented  Enter H758 in the search box to learn more about \"Learning About Alcohol Use Disorder.\"  Current as of: November 15, 2023               Content Version: 14.0    5025-2683 [employer].   Care instructions " adapted under license by your healthcare professional. If you have questions about a medical condition or this instruction, always ask your healthcare professional. [employer] disclaims any warranty or liability for your use of this information.      Lung Cancer Screening   Frequently Asked Questions  If you are at high-risk for lung cancer, getting screened with low-dose computed tomography (LDCT) every year can help save your life. This handout offers answers to some of the most common questions about lung cancer screening. If you have other questions, please call [phone] ([phone]).     What is it?  Lung cancer screening uses special X-ray technology to create an image of your lung tissue. The exam is quick and easy and takes less than 10 seconds. We don t give you any medicine or use any needles. You can eat before and after the exam. You don t need to change your clothes as long as the clothing on your chest doesn t contain metal. But, you do need to be able to hold your breath for at least 6 seconds during the exam.    What is the goal of lung cancer screening?  The goal of lung cancer screening is to save lives. Many times, lung cancer is not found until a person starts having physical symptoms. Lung cancer screening can help detect lung cancer in the earliest stages when it may be easier to treat.    Who should be screened for lung cancer?  We suggest lung cancer screening for anyone who is at high-risk for lung cancer. You are in the high-risk group if you:      are between the ages of 55 and 79, and    have smoked at least 1 pack of cigarettes a day for 20 or more years, and    still smoke or have quit within the past 15 years.    However, if you have a new cough or shortness of breath, you should talk to your doctor before being screened.    Why does it matter if I have symptoms?  Certain symptoms can be a sign that you have a condition in your lungs that should be checked  and treated by your doctor. These symptoms include fever, chest pain, a new or changing cough, shortness of breath that you have never felt before, coughing up blood or unexplained weight loss. Having any of these symptoms can greatly affect the results of lung cancer screening.       Should all smokers get an LDCT lung cancer screening exam?  It depends. Lung cancer screening is for a very specific group of men and women who have a history of heavy smoking over a long period of time (see  Who should be screened for lung cancer  above).  I am in the high-risk group, but have been diagnosed with cancer in the past. Is LDCT lung cancer screening right for me?  In some cases, you should not have LDCT lung screening, such as when your doctor is already following your cancer with CT scan studies. Your doctor will help you decide if LDCT lung screening is right for you.  Do I need to have a screening exam every year?  Yes. If you are in the high-risk group described earlier, you should get an LDCT lung cancer screening exam every year until you are 79, or are no longer willing or able to undergo screening and possible procedures to diagnose and treat lung cancer.  How effective is LDCT at preventing death from lung cancer?  Studies have shown that LDCT lung cancer screening can lower the risk of death from lung cancer by 20 percent in people who are at high-risk.  What are the risks?  There are some risks and limitations of LDCT lung cancer screening. We want to make sure you understand the risks and benefits, so please let us know if you have any questions. Your doctor may want to talk with you more about these risks.    Radiation exposure: As with any exam that uses radiation, there is a very small increased risk of cancer. The amount of radiation in LDCT is small--about the same amount a person would get from a mammogram. Your doctor orders the exam when he or she feels the potential benefits outweigh the risks.     False negatives: No test is perfect, including LDCT. It is possible that you may have a medical condition, including lung cancer, that is not found during your exam. This is called a false negative result.    False positives and more testing: LDCT very often finds something in the lung that could be cancer, but in fact is not. This is called a false positive result. False positive tests often cause anxiety. To make sure these findings are not cancer, you may need to have more tests. These tests will be done only if you give us permission. Sometimes patients need a treatment that can have side effects, such as a biopsy. For more information on false positives, see  What can I expect from the results?     Findings not related to lung cancer: Your LDCT exam also takes pictures of areas of your body next to your lungs. In a very small number of cases, the CT scan will show an abnormal finding in one of these areas, such as your kidneys, adrenal glands, liver or thyroid. This finding may not be serious, but you may need more tests. Your doctor can help you decide what other tests you may need, if any.  What can I expect from the results?  About 1 out of 4 LDCT exams will find something that may need more tests. Most of the time, these findings are lung nodules. Lung nodules are very small collections of tissue in the lung. These nodules are very common, and the vast majority--more than 97 percent--are not cancer (benign). Most are normal lymph nodes or small areas of scarring from past infections.  But, if a small lung nodule is found to be cancer, the cancer can be cured more than 90 percent of the time. To know if the nodule is cancer, we may need to get more images before your next yearly screening exam. If the nodule has suspicious features (for example, it is large, has an odd shape or grows over time), we will refer you to a specialist for further testing.  Will my doctor also get the results?  Yes. Your doctor  will get a copy of your results.  Is it okay to keep smoking now that there s a cancer screening exam?  No. Tobacco is one of the strongest cancer-causing agents. It causes not only lung cancer, but other cancers and cardiovascular (heart) diseases as well. The damage caused by smoking builds over time. This means that the longer you smoke, the higher your risk of disease. While it is never too late to quit, the sooner you quit, the better.  Where can I find help to quit smoking?  The best way to prevent lung cancer is to stop smoking. If you have already quit smoking, congratulations and keep it up! For help on quitting smoking, please call [employer] at 5-094-QUITNOW ([phone]) or the American [employer] Society at 1-945.783.3931 to find local resources near you.  One-on-one health coaching:  If you d prefer to work individually with a health care provider on tobacco cessation, we offer:      Medication Therapy Management:  Our specially trained pharmacists work closely with you and your doctor to help you quit smoking.  Call [phone] or [phone] (toll free).

## 2024-03-14 NOTE — LETTER
March 15, 2024      Watson Price  [address] [address]        Dear ,    We are writing to inform you of your test results.    Your triglycerides remain very high, putting you at risk for pancreatitis. This is almost certainly form the alcohol and I would advise that you stop completely, if possible. The lipitor med helps a little with lowering them too. Also, weight loss can bring them down too.     Resulted Orders   Lipid Profile   Result Value Ref Range    Cholesterol 171 <200 mg/dL    Triglycerides 671 (H) <150 mg/dL    Direct Measure HDL 37 (L) >=40 mg/dL    LDL Cholesterol Calculated        Comment:      Cannot estimate LDL when triglyceride exceeds 400 mg/dL    Non HDL Cholesterol 134 (H) <130 mg/dL    Patient Fasting > 8hrs? No     Narrative    Cholesterol  Desirable:  <200 mg/dL    Triglycerides  Normal:  Less than 150 mg/dL  Borderline High:  150-199 mg/dL  High:  200-499 mg/dL  Very High:  Greater than or equal to 500 mg/dL    Direct Measure HDL  Female:  Greater than or equal to 50 mg/dL   Male:  Greater than or equal to 40 mg/dL    LDL Cholesterol  Desirable:  <100mg/dL  Above Desirable:  100-129 mg/dL   Borderline High:  130-159 mg/dL   High:  160-189 mg/dL   Very High:  >= 190 mg/dL    Non HDL Cholesterol  Desirable:  130 mg/dL  Above Desirable:  130-159 mg/dL  Borderline High:  160-189 mg/dL  High:  190-219 mg/dL  Very High:  Greater than or equal to 220 mg/dL   PSA Screen GH   Result Value Ref Range    Prostate Specific Antigen Screen 0.94 0.00 - 4.50 ng/mL    Narrative    This result is obtained using the Roche Elecsys total PSA method on the shaye e601 immunoassay analyzer. Results obtained with different assay methods or kits cannot be used interchangeably.   Comprehensive Metabolic Panel   Result Value Ref Range    Sodium 138 135 - 145 mmol/L      Comment:      Reference intervals for this test were updated on 09/26/2023 to more accurately reflect our healthy  population. There may be differences in the flagging of prior results with similar values performed with this method. Interpretation of those prior results can be made in the context of the updated reference intervals.     Potassium 4.5 3.4 - 5.3 mmol/L    Carbon Dioxide (CO2) 29 22 - 29 mmol/L    Anion Gap 9 7 - 15 mmol/L    Urea Nitrogen 16.5 8.0 - 23.0 mg/dL    Creatinine 0.98 0.67 - 1.17 mg/dL    GFR Estimate 86 >60 mL/min/1.73m2    Calcium 10.1 8.8 - 10.2 mg/dL    Chloride 100 98 - 107 mmol/L    Glucose 91 70 - 99 mg/dL    Alkaline Phosphatase 76 40 - 150 U/L      Comment:      Reference intervals for this test were updated on 11/14/2023 to more accurately reflect our healthy population. There may be differences in the flagging of prior results with similar values performed with this method. Interpretation of those prior results can be made in the context of the updated reference intervals.    AST 28 0 - 45 U/L      Comment:      Reference intervals for this test were updated on 6/12/2023 to more accurately reflect our healthy population. There may be differences in the flagging of prior results with similar values performed with this method. Interpretation of those prior results can be made in the context of the updated reference intervals.    ALT 57 0 - 70 U/L      Comment:      Reference intervals for this test were updated on 6/12/2023 to more accurately reflect our healthy population. There may be differences in the flagging of prior results with similar values performed with this method. Interpretation of those prior results can be made in the context of the updated reference intervals.      Protein Total 7.4 6.4 - 8.3 g/dL    Albumin 4.6 3.5 - 5.2 g/dL    Bilirubin Total 0.4 <=1.2 mg/dL       If you have any questions or concerns, please call the clinic at the number listed above.       Sincerely,      Tal Crockett MD

## 2024-03-16 LAB
HCV AB SERPL QL IA: NONREACTIVE
HIV 1+2 AB+HIV1 P24 AG SERPL QL IA: NONREACTIVE

## 2024-04-22 ENCOUNTER — HOSPITAL ENCOUNTER (OUTPATIENT)
Dept: CT IMAGING | Facility: OTHER | Age: 64
Discharge: HOME | End: 2024-04-22
Attending: FAMILY MEDICINE | Admitting: FAMILY MEDICINE
Payer: COMMERCIAL

## 2024-04-22 DIAGNOSIS — Z87.891 PERSONAL HISTORY OF TOBACCO USE: ICD-10-CM

## 2024-04-22 PROCEDURE — 71271 CT THORAX LUNG CANCER SCR C-: CPT

## 2024-10-24 ENCOUNTER — IMMUNIZATION (OUTPATIENT)
Dept: FAMILY MEDICINE | Facility: OTHER | Age: 64
End: 2024-10-24
Attending: INTERNAL MEDICINE
Payer: COMMERCIAL

## 2024-10-24 DIAGNOSIS — Z23 HIGH PRIORITY FOR 2019-NCOV VACCINE: ICD-10-CM

## 2024-10-24 DIAGNOSIS — Z23 NEED FOR PROPHYLACTIC VACCINATION AND INOCULATION AGAINST INFLUENZA: Primary | ICD-10-CM

## 2024-10-24 PROCEDURE — 90480 ADMN SARSCOV2 VAC 1/ONLY CMP: CPT

## 2024-10-24 PROCEDURE — 90673 RIV3 VACCINE NO PRESERV IM: CPT

## 2024-11-26 NOTE — PROGRESS NOTES
Form received, Pending sign off   Nursing Notes:   Lashell Alfaro LPN  2/12/2019  1:54 PM  Signed  Patient comes in to the clinic today for medication review and refills.    Lashell Alfaro LPN  2/12/2019  1:54 PM  Signed  No LMP for male patient.  Medication Reconciliation: complete    Lashell Alfaro LPN  2/12/2019 1:53 PM        SUBJECTIVE:  Watson Price is a 58 year old male who comes in today to follow-up on chronic issues.  He is here to follow-up on hypertension.  Continues on Toprol-XL without side effect, although does note insidious onset erectile dysfunction.  Patient does continue to smoke.  He has not had any chest pain, shortness of breath or anginal equivalents.  [place]  He does report having some pain in his right knee just under the patella after kneeling on concrete and it sounds like there may have been something on top of the concrete.  Pain waxed and waned for quite some time.  Now starting to improve.  No locking or catching.    He has had itchy scalp and has noticed a lot of flaking with buildup of seborrhea.    He reports the Lamisil has worked quite well on his toenails but he still has a couple of problem areas.    He is very happy with his current urination on the oxybutynin and Flomax.  Reports no incontinence.  No dribbling.  Feels like he has complete emptying.  Urgency has resolved.  Does have some erectile dysfunction which he reports is insidious onset.      PHQ-9  PHQ-9 SCORE 5/26/2015 4/12/2016   PHQ-9 Total Score 0 0         Current Outpatient Medications   Medication Sig Dispense Refill     ketoconazole (NIZORAL) 2 % external cream Apply topically daily 60 g 0     metoprolol succinate ER (TOPROL-XL) 100 MG 24 hr tablet Take 1 tablet (100 mg) by mouth daily 90 tablet 3     oxybutynin ER (DITROPAN-XL) 5 MG 24 hr tablet Take 1 tablet (5 mg) by mouth daily 90 tablet 3     sildenafil (REVATIO) 20 MG tablet Take 20-60mg prior to sex (start with lower dose).  Do not take with  nitrates.  Go to ED if erection lasts greater than 4 hours. 30 tablet 3     simvastatin (ZOCOR) 20 MG tablet Take 1 tablet (20 mg) by mouth At Bedtime 90 tablet 3     tamsulosin (FLOMAX) 0.4 MG capsule Take 1 capsule (0.4 mg) by mouth daily 90 capsule 3     terbinafine (LAMISIL) 1 % external cream Apply topically 2 times daily 30 g 1         Social History     Socioeconomic History     Marital status:      Spouse name: Not on file     Number of children: Not on file     Years of education: Not on file     Highest education level: Not on file   Social Needs     Financial resource strain: Not on file     Food insecurity - worry: Not on file     Food insecurity - inability: Not on file     Transportation needs - medical: Not on file     Transportation needs - non-medical: Not on file   Occupational History     Not on file   Tobacco Use     Smoking status: Current Every Day Smoker     Packs/day: 5.00     Types: Cigars     Smokeless tobacco: Never Used   Substance and Sexual Activity     Alcohol use: Yes     Alcohol/week: 14.4 oz     Comment: Alcoholic Drinks/day: case of beer per week     Drug use: Unknown     Types: Other     Comment: Drug use: No     Sexual activity: Not on file   Other Topics Concern     Not on file   Social History Narrative    .  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    Works as a [employer] with [employer]  Smokes cigars.  Positive alcohol.       I have personally reviewed and updated above noted social, family and/or past medical history.    ROS  CONSTITUTIONAL: NEGATIVE for fever, chills, change in weight  INTEGUMENTARY/SKIN: NEGATIVE for worrisome rashes, moles or lesions positive for scalp itch and seborrhea  ENT/MOUTH: NEGATIVE for ear, mouth and throat problems  RESP: NEGATIVE for significant cough or SOB  CV: NEGATIVE for chest pain, palpitations or peripheral edema  : Positive for erectile dysfunction  MUSCULOSKELETAL: See HPI  PSYCHIATRIC: NEGATIVE for changes in mood or  "affect      /82   Pulse 76   Temp 98  F (36.7  C)   Resp 18   Ht 1.778 m (5' 10\")   Wt 105.7 kg (233 lb)   BMI 33.43 kg/m      Exam:  GENERAL: healthy, alert and no distress  NECK: no adenopathy, no asymmetry, masses, or scars and thyroid normal to palpation  RESP: lungs clear to auscultation - no rales, rhonchi or wheezes  CV: regular rate and rhythm, normal S1 S2, no S3 or S4, no murmur, click or rub, no peripheral edema and peripheral pulses strong  ABDOMEN: soft, nontender, no hepatosplenomegaly, no masses and bowel sounds normal  MS: no gross musculoskeletal defects noted, no edema.  Affected knee with no visual defomities.  No effusion present.Patella without crepitus.  No joint line tenderness.  Mild prepatellar tenderness with no swelling presently.  All knee ligaments in tact and bilaterally symmetric.  Lisa's is negative and no pain with deep knee flexion.  SKIN: Seborrheic dermatitis noted on scalp.  Otherwise no concerning skin lesions or rashes  PSYCH: mentation appears normal, affect normal/bright      ASSESSMENT/Plan :    I personally reviewed the patients' labs and imaging.    Results for orders placed or performed in visit on 02/12/19   PSA Screen GH   Result Value Ref Range    PSA Screen 0.808 <3.100 ng/mL   Basic metabolic panel   Result Value Ref Range    Sodium 141 134 - 144 mmol/L    Potassium 4.2 3.5 - 5.1 mmol/L    Chloride 108 (H) 98 - 107 mmol/L    Carbon Dioxide 23 21 - 31 mmol/L    Anion Gap 10 3 - 14 mmol/L    Glucose 136 (H) 70 - 105 mg/dL    Urea Nitrogen 15 7 - 25 mg/dL    Creatinine 0.80 0.70 - 1.30 mg/dL    GFR Estimate >90 >60 mL/min/[1.73_m2]    GFR Estimate If Black >90 >60 mL/min/[1.73_m2]    Calcium 9.4 8.6 - 10.3 mg/dL   Lipid Profile   Result Value Ref Range    Cholesterol 182 <200 mg/dL    Triglycerides 278 (H) <150 mg/dL    HDL Cholesterol 47 23 - 92 mg/dL    LDL Cholesterol Calculated 79 <100 mg/dL    Non HDL Cholesterol 135 (H) <130 mg/dL       No images " are attached to the encounter or orders placed in the encounter.      1. Onychomycosis due to dermatophyte  May continue with terbinafine cream.  - terbinafine (LAMISIL) 1 % external cream; Apply topically 2 times daily  Dispense: 30 g; Refill: 1    2. Benign prostatic hyperplasia with lower urinary tract symptoms, symptom details unspecified  Continue with Flomax.  PSA rechecked and within the normal range  - tamsulosin (FLOMAX) 0.4 MG capsule; Take 1 capsule (0.4 mg) by mouth daily  Dispense: 90 capsule; Refill: 3  - PSA Screen GH; Future  - PSA Screen GH    3. High blood cholesterol  Continue current dose of simvastatin.  Triglycerides remain high but LDL less than 100.  Chol/HDL ratio = 3.87.  Only suggest tobacco cessation  - simvastatin (ZOCOR) 20 MG tablet; Take 1 tablet (20 mg) by mouth At Bedtime  Dispense: 90 tablet; Refill: 3  - Lipid Profile; Future  - Lipid Profile    4. Urinary urgency  Patient reports oxybutynin is worked well and he would like to continue.  - oxybutynin ER (DITROPAN-XL) 5 MG 24 hr tablet; Take 1 tablet (5 mg) by mouth daily  Dispense: 90 tablet; Refill: 3    5. Essential hypertension  Blood pressure at goal.  Continue metoprolol.  - metoprolol succinate ER (TOPROL-XL) 100 MG 24 hr tablet; Take 1 tablet (100 mg) by mouth daily  Dispense: 90 tablet; Refill: 3  - Basic metabolic panel; Future  - Basic metabolic panel    6. Seborrheic dermatitis  May trial ketoconazole cream for seborrheic dermatitis.  - ketoconazole (NIZORAL) 2 % external cream; Apply topically daily  Dispense: 60 g; Refill: 0    7. Erectile dysfunction, unspecified erectile dysfunction type  Discussed with patient potential causes of erectile dysfunction including medications, vascular disease, hypo-testosteronism which can be natural and age-related.  Strongly suggest tobacco cessation he declines further workup at this time but would like to trial sildenafil.  Patient denies any angina or anginal equivalents.  We  discussed risks of the medication as outlined below.    In the future could consider transitioning from metoprolol to Norvasc to see if this helps.  - sildenafil (REVATIO) 20 MG tablet; Take 20-60mg prior to sex (start with lower dose).  Do not take with nitrates.  Go to ED if erection lasts greater than 4 hours.  Dispense: 30 tablet; Refill: 3    8.  Prepatellar bursitis  Exam underwhelming today.  Suggest icing for 10 minutes 3 times per day.  May trial some ibuprofen as well.  Use knee padding or kneeling pad in future.          There are no Patient Instructions on file for this visit.    Kang Flynn    This document was created using computer generated templates and voiceactivated software.

## 2025-04-03 DIAGNOSIS — Z12.11 ENCOUNTER FOR SCREENING COLONOSCOPY: Primary | ICD-10-CM

## 2025-04-03 NOTE — TELEPHONE ENCOUNTER
Screening Questions for the Scheduling of Screening Colonoscopies   (If Colonoscopy is diagnostic, Provider should review the chart before scheduling.)  Are you younger than 45 or older than 80?  NO  Do you take aspirin or fish oil?  NO (if yes, tell patient to stop 1 week prior to Colonoscopy)  Do you take warfarin (Coumadin), clopidogrel (Plavix), apixaban (Eliquis), dabigatram (Pradaxa), rivaroxaban (Xarelto) or any blood thinner? NO  Do you take semaglutide (Ozempic or Wegovy), tirzepatide (Mounjaro or Zepbound), liraglutide (Victoza), or dulaglutide (Trulicity)? NO  Do you use oxygen or a CPAP at home?  NO  Do you have kidney disease? NO  Are you on dialysis? NO  Have you had a stroke or heart attack in the last year? NO  Have you had a stent in your heart or any blood vessel in the last year? NO  Have you had a transplant of any organ? NO  Have you had a colonoscopy or upper endoscopy (EGD) before? YES         When?  2021  Date of scheduled Colonoscopy. 05/20/2025  [facility]  [facility]

## 2025-04-07 RX ORDER — POLYETHYLENE GLYCOL 3350, SODIUM CHLORIDE, SODIUM BICARBONATE, POTASSIUM CHLORIDE 420; 11.2; 5.72; 1.48 G/4L; G/4L; G/4L; G/4L
4000 POWDER, FOR SOLUTION ORAL ONCE
Qty: 4000 ML | Refills: 0 | Status: SHIPPED | OUTPATIENT
Start: 2025-05-13 | End: 2025-05-13

## 2025-04-07 RX ORDER — BISACODYL 5 MG/1
TABLET, DELAYED RELEASE ORAL
Qty: 2 TABLET | Refills: 0 | Status: SHIPPED | OUTPATIENT
Start: 2025-05-13

## 2025-04-29 ENCOUNTER — HOSPITAL ENCOUNTER (OUTPATIENT)
Dept: CT IMAGING | Facility: OTHER | Age: 65
Discharge: HOME | End: 2025-04-29
Attending: FAMILY MEDICINE | Admitting: RADIOLOGY
Payer: COMMERCIAL

## 2025-04-29 DIAGNOSIS — Z87.891 HISTORY OF TOBACCO USE, PRESENTING HAZARDS TO HEALTH: ICD-10-CM

## 2025-04-29 PROCEDURE — 71271 CT THORAX LUNG CANCER SCR C-: CPT

## 2025-04-29 PROCEDURE — 71271 CT THORAX LUNG CANCER SCR C-: CPT | Mod: 26 | Performed by: RADIOLOGY

## 2025-05-20 ENCOUNTER — ANESTHESIA EVENT (OUTPATIENT)
Dept: SURGERY | Facility: OTHER | Age: 65
End: 2025-05-20
Payer: COMMERCIAL

## 2025-05-20 ENCOUNTER — ANESTHESIA (OUTPATIENT)
Dept: SURGERY | Facility: OTHER | Age: 65
End: 2025-05-20
Payer: COMMERCIAL

## 2025-05-20 ENCOUNTER — HOSPITAL ENCOUNTER (OUTPATIENT)
Facility: OTHER | Age: 65
Discharge: HOME | End: 2025-05-20
Attending: SURGERY | Admitting: SURGERY
Payer: COMMERCIAL

## 2025-05-20 VITALS
BODY MASS INDEX: 37.65 KG/M2 | DIASTOLIC BLOOD PRESSURE: 85 MMHG | SYSTOLIC BLOOD PRESSURE: 125 MMHG | OXYGEN SATURATION: 96 % | WEIGHT: 263 LBS | HEIGHT: 70 IN | RESPIRATION RATE: 18 BRPM | TEMPERATURE: 97.2 F

## 2025-05-20 PROCEDURE — 88305 TISSUE EXAM BY PATHOLOGIST: CPT | Mod: TC

## 2025-05-20 PROCEDURE — 45380 COLONOSCOPY AND BIOPSY: CPT | Mod: PT | Performed by: SURGERY

## 2025-05-20 PROCEDURE — 258N000003 HC RX IP 258 OP 636: Performed by: SURGERY

## 2025-05-20 PROCEDURE — 45385 COLONOSCOPY W/LESION REMOVAL: CPT | Mod: PT | Performed by: SURGERY

## 2025-05-20 PROCEDURE — 45385 COLONOSCOPY W/LESION REMOVAL: CPT | Mod: PT

## 2025-05-20 PROCEDURE — 999N000010 HC STATISTIC ANES STAT CODE-CRNA PER MINUTE: Performed by: SURGERY

## 2025-05-20 PROCEDURE — 250N000011 HC RX IP 250 OP 636: Performed by: NURSE ANESTHETIST, CERTIFIED REGISTERED

## 2025-05-20 PROCEDURE — 250N000009 HC RX 250: Performed by: NURSE ANESTHETIST, CERTIFIED REGISTERED

## 2025-05-20 PROCEDURE — 45380 COLONOSCOPY AND BIOPSY: CPT | Performed by: SURGERY

## 2025-05-20 RX ORDER — NALOXONE HYDROCHLORIDE 0.4 MG/ML
0.4 INJECTION, SOLUTION INTRAMUSCULAR; INTRAVENOUS; SUBCUTANEOUS
Status: DISCONTINUED | OUTPATIENT
Start: 2025-05-20 | End: 2025-05-20 | Stop reason: HOSPADM

## 2025-05-20 RX ORDER — PROPOFOL 10 MG/ML
INJECTION, EMULSION INTRAVENOUS CONTINUOUS PRN
Status: DISCONTINUED | OUTPATIENT
Start: 2025-05-20 | End: 2025-05-20

## 2025-05-20 RX ORDER — NALOXONE HYDROCHLORIDE 0.4 MG/ML
0.2 INJECTION, SOLUTION INTRAMUSCULAR; INTRAVENOUS; SUBCUTANEOUS
Status: DISCONTINUED | OUTPATIENT
Start: 2025-05-20 | End: 2025-05-20 | Stop reason: HOSPADM

## 2025-05-20 RX ORDER — FLUMAZENIL 0.1 MG/ML
0.2 INJECTION, SOLUTION INTRAVENOUS
Status: DISCONTINUED | OUTPATIENT
Start: 2025-05-20 | End: 2025-05-20 | Stop reason: HOSPADM

## 2025-05-20 RX ORDER — SODIUM CHLORIDE, SODIUM LACTATE, POTASSIUM CHLORIDE, CALCIUM CHLORIDE 600; 310; 30; 20 MG/100ML; MG/100ML; MG/100ML; MG/100ML
INJECTION, SOLUTION INTRAVENOUS CONTINUOUS
Status: DISCONTINUED | OUTPATIENT
Start: 2025-05-20 | End: 2025-05-20 | Stop reason: HOSPADM

## 2025-05-20 RX ORDER — LIDOCAINE 40 MG/G
CREAM TOPICAL
Status: DISCONTINUED | OUTPATIENT
Start: 2025-05-20 | End: 2025-05-20 | Stop reason: HOSPADM

## 2025-05-20 RX ORDER — PROPOFOL 10 MG/ML
INJECTION, EMULSION INTRAVENOUS PRN
Status: DISCONTINUED | OUTPATIENT
Start: 2025-05-20 | End: 2025-05-20

## 2025-05-20 RX ORDER — LIDOCAINE HYDROCHLORIDE 20 MG/ML
INJECTION, SOLUTION INFILTRATION; PERINEURAL PRN
Status: DISCONTINUED | OUTPATIENT
Start: 2025-05-20 | End: 2025-05-20

## 2025-05-20 RX ADMIN — PROPOFOL 50 MG: 10 INJECTION, EMULSION INTRAVENOUS at 09:07

## 2025-05-20 RX ADMIN — SODIUM CHLORIDE, SODIUM LACTATE, POTASSIUM CHLORIDE, AND CALCIUM CHLORIDE: .6; .31; .03; .02 INJECTION, SOLUTION INTRAVENOUS at 08:09

## 2025-05-20 RX ADMIN — PROPOFOL 160 MCG/KG/MIN: 10 INJECTION, EMULSION INTRAVENOUS at 09:04

## 2025-05-20 RX ADMIN — PROPOFOL 100 MG: 10 INJECTION, EMULSION INTRAVENOUS at 09:04

## 2025-05-20 RX ADMIN — LIDOCAINE HYDROCHLORIDE 40 MG: 20 INJECTION, SOLUTION INFILTRATION; PERINEURAL at 09:04

## 2025-05-20 RX ADMIN — PROPOFOL 50 MG: 10 INJECTION, EMULSION INTRAVENOUS at 09:11

## 2025-05-20 ASSESSMENT — ACTIVITIES OF DAILY LIVING (ADL)
ADLS_ACUITY_SCORE: 41

## 2025-05-20 ASSESSMENT — LIFESTYLE VARIABLES: TOBACCO_USE: 1

## 2025-05-20 NOTE — ANESTHESIA CARE TRANSFER NOTE
Patient: Watson Price    Procedure: Procedure(s):  COLONOSCOPY, WITH POLYPECTOMY       Diagnosis: Colon cancer screening [Z12.11]  Diagnosis Additional Information: No value filed.    Anesthesia Type:   MAC     Note:    Oropharynx: oropharynx clear of all foreign objects  Level of Consciousness: awake  Oxygen Supplementation: nasal cannula  Level of Supplemental Oxygen (L/min / FiO2): 2  Independent Airway: airway patency satisfactory and stable  Dentition: dentition unchanged  Vital Signs Stable: post-procedure vital signs reviewed and stable  Report to RN Given: handoff report given  Patient transferred to: Phase II    Handoff Report: Identifed the Patient, Identified the Reponsible Provider, Reviewed the pertinent medical history, Discussed the surgical course, Reviewed Intra-OP anesthesia mangement and issues during anesthesia, Set expectations for post-procedure period and Allowed opportunity for questions and acknowledgement of understanding  Vitals:  Vitals Value Taken Time   BP     Temp     Pulse     Resp     SpO2         Electronically Signed By: KATHLEEN Miller CRNA  May 20, 2025  9:35 AM

## 2025-05-20 NOTE — ANESTHESIA POSTPROCEDURE EVALUATION
Patient: Watson Price    Procedure: Procedure(s):  COLONOSCOPY, WITH POLYPECTOMY       Anesthesia Type:  MAC    Note:  Disposition: Outpatient   Postop Pain Control: Uneventful            Sign Out: Well controlled pain   PONV: No   Neuro/Psych: Uneventful            Sign Out: Acceptable/Baseline neuro status   Airway/Respiratory: Uneventful            Sign Out: Acceptable/Baseline resp. status   CV/Hemodynamics: Uneventful            Sign Out: Acceptable CV status; No obvious hypovolemia; No obvious fluid overload   Other NRE: NONE   DID A NON-ROUTINE EVENT OCCUR? No       Last vitals:  Vitals Value Taken Time   BP     Temp     Pulse     Resp     SpO2         Electronically Signed By: KATHLEEN Miller CRNA  May 20, 2025  11:53 AM

## 2025-05-20 NOTE — OP NOTE
COLONOSCOPY PROCEDURE NOTE    DATE OF SERVICE: 5/20/2025    SURGEON: ADOLPH Braga MD     PRE-OP DIAGNOSIS:    Screening for colon cancer   History of Polyps    POST-OP DIAGNOSIS:    Polyps at transverse colon x2, descending colon x1    PROCEDURE:   Colonoscopy with polypectomy    ASSISTANT:  Circulator: Apple Valdez RN  Relief Circulator: Desire Dawson RN  Scrub Person: Karyna Wright    ANESTHESIA:  MAC                            MACCRNA Independent: Aditya Edmonds APRN CRNA    INDICATION FOR THE PROCEDURE: The patient is a 64 year old male with history of colon polyps. The patient has no other complaints.  After explaining the risks to include bleeding, perforation, potential inability to reach the cecum the patient wishes to proceed.    PROCEDURE: After adequate sedation, the patient was in the left lateral decubitus position.  Rectal exam was performed.  There was normal tone and no palpable masses.  The colonoscope was introduced into the rectum and advanced to the cecum with Mild difficulty.  The patient's prep was good.  The terminal cecum was reached.  The cecum, ascending, transverse, descending and sigmoid colon were significant for two 4mm polyps in the transverse colon removed with cold forceps and one 7mm polyp in the descending colon removed with cold snare .  The scope was retroflexed in the rectum.  The anorectal junction was unremarkable.  The scope was straightened and removed.  The patient tolerated the procedure well.     ESTIMATED BLOOD LOSS: none    COMPLICATIONS:  None    TISSUE REMOVED:  Yes    RECOMMEND:    Follow-up pending pathology      ADOLPH Braga MD

## 2025-05-20 NOTE — ANESTHESIA PREPROCEDURE EVALUATION
Anesthesia Pre-Procedure Evaluation    Patient: Watson Price   MRN: [id number] : 1960          Procedure : Procedure(s):  Colonoscopy         Past Medical History:   Diagnosis Date    Diverticulosis of large intestine without perforation or abscess without bleeding     1/3/2011, inactive icd-9 diagnosis auto replaced with icd-10, display name retained//mporz    Essential (primary) hypertension     No Comments Provided    Impingement syndrome of right shoulder     2015    Migraine without status migrainosus, not intractable     No Comments Provided    Nodular prostate with lower urinary tract symptoms     No Comments Provided    Other shoulder lesions, right shoulder     2015    Other specified postprocedural states     2016    Right rotator cuff tear     2015      Past Surgical History:   Procedure Laterality Date    COLONOSCOPY  2011    Next due-  hyperplastic polyp Dr Martinez    COLONOSCOPY N/A 2021    adnomatous, f/u 3 years    FRACTURE SURGERY Left      Mandibular fracture with subsequent ORIF    OTHER SURGICAL HISTORY Right 2016      No Known Allergies   Social History     Tobacco Use    Smoking status: Every Day     Types: Cigars    Smokeless tobacco: Never    Tobacco comments:     5 cigars a day   Substance Use Topics    Alcohol use: Yes     Alcohol/week: 24.0 standard drinks of alcohol     Comment: Alcoholic Drinks/day: case of beer per week      Wt Readings from Last 1 Encounters:   25 119.3 kg (263 lb)        Anesthesia Evaluation   Pt has had prior anesthetic.     No history of anesthetic complications       ROS/MED HX  ENT/Pulmonary:     (+)                tobacco use, Current use,                       Neurologic:  - neg neurologic ROS     Cardiovascular:     (+)  hypertension- -   -  - -                                      METS/Exercise Tolerance: 4 - Raking leaves, gardening    Hematologic:  - neg hematologic  ROS     Musculoskeletal:  - neg  "musculoskeletal ROS     GI/Hepatic:  - neg GI/hepatic ROS     Renal/Genitourinary:  - neg Renal ROS     Endo:     (+)               Obesity,       Psychiatric/Substance Use:     (+)   alcohol abuse      Infectious Disease:  - neg infectious disease ROS     Malignancy:  - neg malignancy ROS     Other:  - neg other ROS            Physical Exam  Airway  Mallampati: II  TM distance: >3 FB  Neck ROM: full  Mouth opening: >= 4 cm    Cardiovascular - normal exam  Rhythm: regular  Rate: normal rate     Dental   (+) Edentulous      Pulmonary Breath sounds clear to auscultation        Neurological   He appears awake, alert and oriented x3.    Other Findings       OUTSIDE LABS:  CBC:   Lab Results   Component Value Date    WBC 6.9 02/28/2018    HGB 14.9 02/28/2018    HCT 44.5 02/28/2018     02/28/2018     BMP:   Lab Results   Component Value Date     03/21/2025     03/14/2024    POTASSIUM 4.5 03/21/2025    POTASSIUM 4.5 03/14/2024    CHLORIDE 103 03/21/2025    CHLORIDE 100 03/14/2024    CO2 25 03/21/2025    CO2 29 03/14/2024    BUN 14.1 03/21/2025    BUN 16.5 03/14/2024    CR 0.96 03/21/2025    CR 0.98 03/14/2024     (H) 03/21/2025    GLC 91 03/14/2024     COAGS: No results found for: \"PTT\", \"INR\", \"FIBR\"  POC: No results found for: \"BGM\", \"HCG\", \"HCGS\"  HEPATIC:   Lab Results   Component Value Date    ALBUMIN 4.6 03/14/2024    PROTTOTAL 7.4 03/14/2024    ALT 57 03/14/2024    AST 28 03/14/2024    ALKPHOS 76 03/14/2024    BILITOTAL 0.4 03/14/2024     OTHER:   Lab Results   Component Value Date    A1C 5.6 04/27/2022    JAKY 9.7 03/21/2025    TSH 2.30 03/21/2025       Anesthesia Plan    ASA Status:  3      NPO Status: NPO Appropriate   Anesthesia Type: MAC.  Induction: intravenous.   Techniques and Equipment:       - Monitoring Plan: standard ASA monitoring     Consents    Anesthesia Plan(s) and associated risks, benefits, and realistic alternatives discussed. Questions answered and " "patient/representative(s) expressed understanding.     - Discussed:     - Discussed with:  Patient               Postoperative Care         Comments:                   KATHLEEN SINGLETARY CRNA    I have reviewed the pertinent notes and labs in the chart from the past 30 days and (re)examined the patient.  Any updates or changes from those notes are reflected in this note.    Clinically Significant Risk Factors Present on Admission                   # Hypertension: Noted on problem list           # Obesity: Estimated body mass index is 37.74 kg/m  as calculated from the following:    Height as of this encounter: 1.778 m (5' 10\").    Weight as of this encounter: 119.3 kg (263 lb).                    "

## 2025-05-20 NOTE — H&P
GENERAL SURGERY CONSULTATION NOTE    Watson Price   [address] [facility] [id number]  64 year old  male    Primary Care Provider:  Tal Crockett      HPI: Watson Price presents to day surgery in need of colonsocopy for history of colon polyps.   Watson Price denies family history of colon cancer. Patient denies change in bowel habits or blood in stools. Previous colonoscopy was 3 yr ago.     REVIEW OF SYSTEMS:    GENERAL: No fevers or chills. Denies fatigue, recent weight loss.  HEENT: No sinus drainage. No changes with vision or hearing. No difficulty swallowing.   LYMPHATICS:  Noswollen nodes in axilla, neck or groin.  CARDIOVASCULAR: Denies chest pain, palpitations and dyspnea on exertion.  PULMONARY: No shortness of breath or cough. No increase in sputum production.  GI: Denies melena,bright red blood in stools. No hematemesis. No constipation or diarrhea.  : No dysuria or hematuria.  SKIN: No recent rashes or ulcers.   HEMATOLOGY:  No history of easy bruising or bleeding.  ENDOCRINE:  No history of diabetes or thyroid problems.  NEUROLOGY:  No history of seizures or headaches. No motor or sensory changes.        Patient Active Problem List   Diagnosis    Benign non-nodular prostatic hyperplasia with lower urinary tract symptoms    Hypertension    Migraine headache    Right rotator cuff tear    S/P shoulder surgery    Pain of finger of left hand    Alcohol dependence (H)    Hypertriglyceridemia    Morbid obesity (H)       Past Medical History:   Diagnosis Date    Diverticulosis of large intestine without perforation or abscess without bleeding     1/3/2011, inactive icd-9 diagnosis auto replaced with icd-10, display name retained//mporz    Essential (primary) hypertension     No Comments Provided    Impingement syndrome of right shoulder     5/21/2015    Migraine without status migrainosus, not intractable     No Comments Provided    Nodular prostate with lower urinary tract symptoms     No  Comments Provided    Other shoulder lesions, right shoulder     5/21/2015    Other specified postprocedural states     1/6/2016    Right rotator cuff tear     6/12/2015       Past Surgical History:   Procedure Laterality Date    COLONOSCOPY  01/03/2011    Next due-2021  hyperplastic polyp Dr Martinez    COLONOSCOPY N/A 04/21/2021    adnomatous, f/u 3 years    FRACTURE SURGERY Left 2009     Mandibular fracture with subsequent ORIF    OTHER SURGICAL HISTORY Right 01/06/2016       Family History   Problem Relation Age of Onset    Other - See Comments Mother 76        Stroke    Other - See Comments Father         Macular degeneration.    Heart Disease Father         Heart Disease,Possible CAD       Social History     Social History Narrative    .  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  Positive alcohol.       Social History     Socioeconomic History    Marital status:      Spouse name: Not on file    Number of children: Not on file    Years of education: Not on file    Highest education level: Not on file   Occupational History    Not on file   Tobacco Use    Smoking status: Every Day     Types: Cigars    Smokeless tobacco: Never    Tobacco comments:     5 cigars a day   Vaping Use    Vaping status: Never Used   Substance and Sexual Activity    Alcohol use: Yes     Alcohol/week: 24.0 standard drinks of alcohol     Comment: Alcoholic Drinks/day: case of beer per week    Drug use: Never    Sexual activity: Yes     Partners: Female   Other Topics Concern    Parent/sibling w/ CABG, MI or angioplasty before 65F 55M? Not Asked   Social History Narrative    .  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  Positive alcohol.     Social Drivers of Health     Financial Resource Strain: Low Risk  (3/21/2025)    Financial Resource Strain     Within the past 12 months, have you or your family members you live  with been unable to get utilities (heat, electricity) when it was really needed?: No   Food Insecurity: Low Risk  (3/21/2025)    Food Insecurity     Within the past 12 months, did you worry that your food would run out before you got money to buy more?: No     Within the past 12 months, did the food you bought just not last and you didn t have money to get more?: No   Transportation Needs: Low Risk  (3/21/2025)    Transportation Needs     Within the past 12 months, has lack of transportation kept you from medical appointments, getting your medicines, non-medical meetings or appointments, work, or from getting things that you need?: No   Physical Activity: Unknown (3/21/2025)    Exercise Vital Sign     Days of Exercise per Week: 2 days     Minutes of Exercise per Session: Not on file   Stress: No Stress Concern Present (3/21/2025)    [employer] - Occupational Stress Questionnaire     Feeling of Stress : Not at all   Social Connections: Unknown (3/21/2025)    Social Connection and Isolation Panel [NHANES]     Frequency of Communication with Friends and Family: Not on file     Frequency of Social Gatherings with Friends and Family: Three times a week     Attends [employer] Services: Not on file     Active Member of Clubs or Organizations: Not on file     Attends Club or Organization Meetings: Not on file     Marital Status: Not on file   Interpersonal Safety: Not on file   Housing Stability: Low Risk  (3/21/2025)    Housing Stability     Do you have housing? : Yes     Are you worried about losing your housing?: No       Current Facility-Administered Medications   Medication Dose Route Frequency Provider Last Rate Last Admin    lactated ringers infusion   Intravenous Continuous Lucio Braga MD 30 mL/hr at 05/20/25 0809 New Bag at 05/20/25 0809    lidocaine (LMX4) cream   Topical Q1H PRN Lucio Braga MD        lidocaine 1 % 0.1-1 mL  0.1-1 mL Other Q1H PRN Omi  "Lucio MARCELINO MD        sodium chloride (PF) 0.9% PF flush 3 mL  3 mL Intracatheter Q8H Lucio Braga MD        sodium chloride (PF) 0.9% PF flush 3 mL  3 mL Intracatheter q1 min prn Lucio Braga MD             ALLERGIES/SENSITIVITIES: No Known Allergies    PHYSICAL EXAM:     BP (!) 140/111   Temp 96.8  F (36  C) (Tympanic)   Resp 18   Ht 1.778 m (5' 10\")   Wt 119.3 kg (263 lb)   SpO2 94%   BMI 37.74 kg/m      General Appearance:   Sitting up in bed, no apparent distress  HEENT: Pupils are equal and reactive, no scleral icterus   Heart & CV:  RRR, no murmur.  LUNGS: No increased work of breathing. Lungs are CTA B/L, no wheezing or crackles.  Abd:  soft, non-tender, no masses   Ext: no lower extremity edema   Neuro: alert and oriented, normal speech and mentation         CONSULTATION ASSESSMENT AND PLAN:    64 year old male with history of colon polyps     The technical details of colonoscopy were discussed with the patient along with the risks and benefits to include bleeding, perforation and incomplete study. Watson Price demonstrated understanding and is willing to proceed.       Lucio Braga MD on 5/20/2025 at 8:23 AM      "

## 2025-05-22 LAB
PATH REPORT.COMMENTS IMP SPEC: NORMAL
PATH REPORT.FINAL DX SPEC: NORMAL
PATH REPORT.RELEVANT HX SPEC: NORMAL
PHOTO IMAGE: NORMAL

## 2025-06-11 ENCOUNTER — RESULTS FOLLOW-UP (OUTPATIENT)
Dept: SURGERY | Facility: CLINIC | Age: 65
End: 2025-06-11
Payer: COMMERCIAL

## (undated) DEVICE — SOL WATER 1500ML

## (undated) DEVICE — ENDO SNARE EXACTO COLD 9MM LOOP 2.4MMX230CM 00711115

## (undated) DEVICE — ENDO FORCEP ENDOJAW BIOPSY 2.8MMX230CM FB-220U

## (undated) DEVICE — TUBING SUCTION 10'X3/16" N510

## (undated) DEVICE — SUCTION MANIFOLD NEPTUNE 2 SYS 4 PORT 0702-020-000

## (undated) DEVICE — ENDO KIT COMPLIANCE DYKENDOCMPLY

## (undated) DEVICE — ENDO TRAP POLYP E-TRAP 00711099

## (undated) DEVICE — ENDO BRUSH CHANNEL MASTER CLEANING 2-4.2MM BW-412T

## (undated) RX ORDER — LIDOCAINE HYDROCHLORIDE 20 MG/ML
INJECTION, SOLUTION EPIDURAL; INFILTRATION; INTRACAUDAL; PERINEURAL
Status: DISPENSED
Start: 2021-04-21

## (undated) RX ORDER — LIDOCAINE HYDROCHLORIDE 10 MG/ML
INJECTION, SOLUTION EPIDURAL; INFILTRATION; INTRACAUDAL; PERINEURAL
Status: DISPENSED
Start: 2020-02-27

## (undated) RX ORDER — PROPOFOL 10 MG/ML
INJECTION, EMULSION INTRAVENOUS
Status: DISPENSED
Start: 2021-04-21